# Patient Record
Sex: FEMALE | Race: WHITE | NOT HISPANIC OR LATINO | Employment: OTHER | URBAN - METROPOLITAN AREA
[De-identification: names, ages, dates, MRNs, and addresses within clinical notes are randomized per-mention and may not be internally consistent; named-entity substitution may affect disease eponyms.]

---

## 2017-10-03 ENCOUNTER — TRANSCRIBE ORDERS (OUTPATIENT)
Dept: URGENT CARE | Facility: MEDICAL CENTER | Age: 42
End: 2017-10-03

## 2017-10-03 ENCOUNTER — APPOINTMENT (OUTPATIENT)
Dept: URGENT CARE | Facility: MEDICAL CENTER | Age: 42
End: 2017-10-03

## 2017-10-03 ENCOUNTER — APPOINTMENT (OUTPATIENT)
Dept: LAB | Facility: MEDICAL CENTER | Age: 42
End: 2017-10-03

## 2017-10-03 DIAGNOSIS — Z02.1 PRE-EMPLOYMENT HEALTH SCREENING EXAMINATION: ICD-10-CM

## 2017-10-03 DIAGNOSIS — Z02.1 PRE-EMPLOYMENT HEALTH SCREENING EXAMINATION: Primary | ICD-10-CM

## 2017-10-03 PROCEDURE — 36415 COLL VENOUS BLD VENIPUNCTURE: CPT

## 2017-10-03 PROCEDURE — 86480 TB TEST CELL IMMUN MEASURE: CPT

## 2017-10-05 LAB
ANNOTATION COMMENT IMP: NORMAL
GAMMA INTERFERON BACKGROUND BLD IA-ACNC: 0.03 IU/ML
M TB IFN-G BLD-IMP: NEGATIVE
M TB IFN-G CD4+ BCKGRND COR BLD-ACNC: 0 IU/ML
M TB IFN-G CD4+ T-CELLS BLD-ACNC: 0.03 IU/ML
MITOGEN IGNF BLD-ACNC: 8.72 IU/ML
QUANTIFERON-TB GOLD IN TUBE: NORMAL
SERVICE CMNT-IMP: NORMAL

## 2020-11-08 ENCOUNTER — HOSPITAL ENCOUNTER (EMERGENCY)
Facility: HOSPITAL | Age: 45
Discharge: HOME | End: 2020-11-08
Attending: EMERGENCY MEDICINE | Admitting: EMERGENCY MEDICINE
Payer: COMMERCIAL

## 2020-11-08 VITALS
SYSTOLIC BLOOD PRESSURE: 170 MMHG | DIASTOLIC BLOOD PRESSURE: 99 MMHG | OXYGEN SATURATION: 100 % | RESPIRATION RATE: 16 BRPM | TEMPERATURE: 97.7 F | WEIGHT: 150 LBS | HEIGHT: 67 IN | BODY MASS INDEX: 23.54 KG/M2 | HEART RATE: 80 BPM

## 2020-11-08 DIAGNOSIS — K08.89 PAIN, DENTAL: Primary | ICD-10-CM

## 2020-11-08 PROCEDURE — 99284 EMERGENCY DEPT VISIT MOD MDM: CPT | Performed by: EMERGENCY MEDICINE

## 2020-11-08 PROCEDURE — 99282 EMERGENCY DEPT VISIT SF MDM: CPT

## 2020-11-08 RX ORDER — PENICILLIN V POTASSIUM 500 MG/1
500 TABLET ORAL 4 TIMES DAILY
Qty: 28 TABLET | Refills: 0 | Status: SHIPPED | OUTPATIENT
Start: 2020-11-08 | End: 2020-11-15

## 2020-11-08 RX ORDER — HYDROCODONE BITARTRATE AND ACETAMINOPHEN 5; 325 MG/1; MG/1
1 TABLET ORAL EVERY 6 HOURS PRN
Qty: 8 TABLET | Refills: 0 | Status: SHIPPED | OUTPATIENT
Start: 2020-11-08

## 2020-11-08 RX ORDER — PENICILLIN V POTASSIUM 250 MG/1
500 TABLET ORAL ONCE
Status: COMPLETED | OUTPATIENT
Start: 2020-11-08 | End: 2020-11-08

## 2020-11-08 RX ORDER — HYDROCODONE BITARTRATE AND ACETAMINOPHEN 5; 325 MG/1; MG/1
1 TABLET ORAL ONCE
Status: COMPLETED | OUTPATIENT
Start: 2020-11-08 | End: 2020-11-08

## 2020-11-08 RX ADMIN — PENICILLIN V POTASSIUM 500 MG: 250 TABLET, FILM COATED ORAL at 11:38

## 2020-11-08 RX ADMIN — HYDROCODONE BITARTRATE AND ACETAMINOPHEN 1 TABLET: 5; 325 TABLET ORAL at 11:48

## 2021-02-11 ENCOUNTER — HOSPITAL ENCOUNTER (EMERGENCY)
Facility: HOSPITAL | Age: 46
Discharge: HOME | End: 2021-02-11
Attending: EMERGENCY MEDICINE
Payer: COMMERCIAL

## 2021-02-11 VITALS
RESPIRATION RATE: 18 BRPM | DIASTOLIC BLOOD PRESSURE: 109 MMHG | TEMPERATURE: 97.8 F | OXYGEN SATURATION: 100 % | SYSTOLIC BLOOD PRESSURE: 168 MMHG | WEIGHT: 142.2 LBS | BODY MASS INDEX: 22.27 KG/M2 | HEART RATE: 70 BPM

## 2021-02-11 DIAGNOSIS — S05.01XA ABRASION OF RIGHT CORNEA, INITIAL ENCOUNTER: Primary | ICD-10-CM

## 2021-02-11 PROCEDURE — 99284 EMERGENCY DEPT VISIT MOD MDM: CPT | Performed by: PHYSICIAN ASSISTANT

## 2021-02-11 PROCEDURE — 99283 EMERGENCY DEPT VISIT LOW MDM: CPT

## 2021-02-11 RX ORDER — ERYTHROMYCIN 5 MG/G
OINTMENT OPHTHALMIC
Qty: 3.5 G | Refills: 0 | Status: SHIPPED | OUTPATIENT
Start: 2021-02-11

## 2021-02-11 RX ADMIN — FLUORESCEIN SODIUM 1 STRIP: 1 STRIP OPHTHALMIC at 12:40

## 2021-02-11 NOTE — ED PROVIDER NOTES
History  Chief Complaint   Patient presents with    Eye Injury     Pt  reports accidently using nail glue instead of eye drops on left eye last night  Pt  reports using water rinses and warm compresses without relief  Pt  states able to open eye but burning and pain  Patient is a 38 y/o female presenting to the ED for evaluation of nail glue in right eye  Patient states she accidentally put nail glue in her right eye last night thinking it was her eye drops  She has had increased pain and burning in her eye  She thoroughly rinsed her eye last night and applied warm compresses without relief  This morning, she went to urgent care and was told she needed to go to the ED  She is able to open the eye and denies visual changes or photophobia  Prior to Admission Medications   Prescriptions Last Dose Informant Patient Reported? Taking? HYDROcodone-acetaminophen (NORCO) 5-325 mg per tablet   No No   Sig: Take 1 tablet by mouth every 6 (six) hours as needed for pain for up to 8 dosesMax Daily Amount: 4 tablets      Facility-Administered Medications: None       History reviewed  No pertinent past medical history  Past Surgical History:   Procedure Laterality Date     SECTION         History reviewed  No pertinent family history  I have reviewed and agree with the history as documented  E-Cigarette/Vaping     E-Cigarette/Vaping Substances     Social History     Tobacco Use    Smoking status: Current Every Day Smoker     Packs/day: 0 50    Smokeless tobacco: Never Used   Substance Use Topics    Alcohol use: Never     Frequency: Never    Drug use: Never       Review of Systems   Constitutional: Negative for appetite change, chills, fatigue and fever  HENT: Negative for congestion, rhinorrhea, sinus pressure, sinus pain and sore throat  Eyes: Positive for pain and redness  Negative for photophobia, discharge, itching and visual disturbance     Respiratory: Negative for cough, shortness of breath and wheezing  Cardiovascular: Negative for chest pain, palpitations and leg swelling  Gastrointestinal: Negative for abdominal pain, blood in stool, constipation, diarrhea, nausea and vomiting  Genitourinary: Negative for difficulty urinating, dysuria, flank pain, frequency, hematuria and urgency  Musculoskeletal: Negative for arthralgias, back pain, joint swelling, myalgias and neck pain  Skin: Negative for color change, pallor and rash  Neurological: Negative for dizziness, syncope, weakness, light-headedness and headaches  All other systems reviewed and are negative  Physical Exam  Physical Exam  Vitals signs and nursing note reviewed  Constitutional:       General: She is awake  She is not in acute distress  Appearance: Normal appearance  She is well-developed  HENT:      Head: Normocephalic and atraumatic  Nose: Nose normal    Eyes:      General: Lids are everted, no foreign bodies appreciated  Vision grossly intact  Gaze aligned appropriately  Right eye: No foreign body or discharge  Left eye: No foreign body or discharge  Extraocular Movements: Extraocular movements intact  Conjunctiva/sclera:      Right eye: Right conjunctiva is injected  Left eye: Left conjunctiva is not injected  Pupils: Pupils are equal, round, and reactive to light  Right eye: Corneal abrasion (small abrasion on inferior edge of cornea) and fluorescein uptake present  Left eye: No corneal abrasion or fluorescein uptake  Comments: Some dried glue around lashes - dissolved/wiped away with sterile lubricant  No visible foreign bodies in the eye  Vision:  R eye - 20/25  L eye - 20/25  Both eyes - 20/20   Neck:      Musculoskeletal: Normal range of motion and neck supple  Cardiovascular:      Rate and Rhythm: Normal rate and regular rhythm  Pulses: Normal pulses  Radial pulses are 2+ on the right side and 2+ on the left side  Heart sounds: Normal heart sounds, S1 normal and S2 normal    Pulmonary:      Effort: Pulmonary effort is normal       Breath sounds: Normal breath sounds  No decreased breath sounds, wheezing, rhonchi or rales  Abdominal:      General: Abdomen is flat  Palpations: Abdomen is soft  Musculoskeletal:      Right lower leg: No edema  Left lower leg: No edema  Lymphadenopathy:      Cervical: No cervical adenopathy  Skin:     General: Skin is warm  Capillary Refill: Capillary refill takes less than 2 seconds  Neurological:      Mental Status: She is alert and oriented to person, place, and time  GCS: GCS eye subscore is 4  GCS verbal subscore is 5  GCS motor subscore is 6  Psychiatric:         Attention and Perception: Attention normal          Mood and Affect: Mood normal          Speech: Speech normal          Behavior: Behavior normal  Behavior is cooperative  Vital Signs  ED Triage Vitals   Temperature Pulse Respirations Blood Pressure SpO2   02/11/21 1231 02/11/21 1229 02/11/21 1229 02/11/21 1231 02/11/21 1229   97 8 °F (36 6 °C) 70 18 (!) 168/109 100 %      Temp Source Heart Rate Source Patient Position - Orthostatic VS BP Location FiO2 (%)   02/11/21 1231 02/11/21 1229 02/11/21 1231 -- --   Oral Monitor Lying        Pain Score       --                  Vitals:    02/11/21 1229 02/11/21 1231   BP:  (!) 168/109   Pulse: 70    Patient Position - Orthostatic VS:  Lying         Visual Acuity      ED Medications  Medications   fluorescein sodium sterile ophthalmic strip 1 strip (1 strip Left Eye Given 2/11/21 1240)       Diagnostic Studies  Results Reviewed     None                 No orders to display              Procedures  Procedures         ED Course                             SBIRT 20yo+      Most Recent Value   SBIRT (25 yo +)   In order to provide better care to our patients, we are screening all of our patients for alcohol and drug use   Would it be okay to ask you these screening questions? Unable to answer at this time Filed at: 02/11/2021 1239                    [facility]  Number of Diagnoses or Management Options  Abrasion of right cornea, initial encounter:   Diagnosis management comments: The management plan was discussed in detail with the patient at bedside and all questions were answered  Prior to discharge, verbal and written instructions provided  Patient given rx erythromycin ointment and follow-up information for ophtho  ED return precautions discussed in detail  The patient verbalized understanding of our discussion and plan of care, and agrees to return to the Emergency Department for concerns and progression of illness  Amount and/or Complexity of Data Reviewed  Discuss the patient with other providers: yes    Patient Progress  Patient progress: stable      Disposition  Final diagnoses:   Abrasion of right cornea, initial encounter     Time reflects when diagnosis was documented in both MDM as applicable and the Disposition within this note     Time User Action Codes Description Comment    2/11/2021  1:20 PM Annalee Borden Add [S05 01XA] Abrasion of right cornea, initial encounter       ED Disposition     ED Disposition Condition Date/Time Comment    Discharge Stable Thu Feb 11, 2021  1:24 PM Nestor Gómez discharge to home/self care              Follow-up Information     Follow up With Specialties Details Why Contact Info Additional [address] Ophthalmology Schedule an appointment as soon as possible for a visit   [id number] [facility] [address] [place] [address] Emergency Department Emergency Medicine  If symptoms worsen [address] Emergency Department, [address] [place] [address], [phone]          Discharge Medication List as of 2/11/2021  1:24 PM      START taking these medications    Details   erythromycin (ILOTYCIN) ophthalmic ointment Place a 1/2 inch ribbon of ointment into the lower eyelid  , Normal         CONTINUE these medications which have NOT CHANGED    Details   HYDROcodone-acetaminophen (NORCO) 5-325 mg per tablet Take 1 tablet by mouth every 6 (six) hours as needed for pain for up to 8 dosesMax Daily Amount: 4 tablets, Starting Sun 11/8/2020, Normal           No discharge procedures on file      PDMP Review     None          ED Provider  Electronically Signed by           Demetrio Sommers PA-C  02/11/21 1354

## 2022-06-26 ENCOUNTER — APPOINTMENT (EMERGENCY)
Dept: CT IMAGING | Facility: HOSPITAL | Age: 47
End: 2022-06-26
Payer: COMMERCIAL

## 2022-06-26 ENCOUNTER — HOSPITAL ENCOUNTER (EMERGENCY)
Facility: HOSPITAL | Age: 47
Discharge: HOME | End: 2022-06-26
Attending: EMERGENCY MEDICINE | Admitting: EMERGENCY MEDICINE
Payer: COMMERCIAL

## 2022-06-26 VITALS
HEART RATE: 70 BPM | BODY MASS INDEX: 23.3 KG/M2 | OXYGEN SATURATION: 97 % | DIASTOLIC BLOOD PRESSURE: 90 MMHG | HEIGHT: 66 IN | WEIGHT: 145 LBS | RESPIRATION RATE: 18 BRPM | TEMPERATURE: 97.1 F | SYSTOLIC BLOOD PRESSURE: 153 MMHG

## 2022-06-26 DIAGNOSIS — R33.8 ACUTE URINARY RETENTION: Primary | ICD-10-CM

## 2022-06-26 DIAGNOSIS — R10.30 LOWER ABDOMINAL PAIN: ICD-10-CM

## 2022-06-26 DIAGNOSIS — M70.72 ILIOPSOAS BURSITIS OF LEFT HIP: ICD-10-CM

## 2022-06-26 LAB
ALBUMIN SERPL BCP-MCNC: 4.7 G/DL (ref 3.5–5)
ALP SERPL-CCNC: 55 U/L (ref 34–104)
ALT SERPL W P-5'-P-CCNC: 59 U/L (ref 7–52)
ANION GAP SERPL CALCULATED.3IONS-SCNC: 10 MMOL/L (ref 4–13)
APTT PPP: 23 SECONDS (ref 23–37)
AST SERPL W P-5'-P-CCNC: 59 U/L (ref 13–39)
BASOPHILS # BLD AUTO: 0.03 THOUSANDS/ΜL (ref 0–0.1)
BASOPHILS NFR BLD AUTO: 1 % (ref 0–1)
BILIRUB SERPL-MCNC: 1.04 MG/DL (ref 0.2–1)
BILIRUB UR QL STRIP: NEGATIVE
BUN SERPL-MCNC: 5 MG/DL (ref 5–25)
CALCIUM SERPL-MCNC: 9.2 MG/DL (ref 8.4–10.2)
CHLORIDE SERPL-SCNC: 101 MMOL/L (ref 96–108)
CLARITY UR: CLEAR
CO2 SERPL-SCNC: 25 MMOL/L (ref 21–32)
COLOR UR: YELLOW
CREAT SERPL-MCNC: 0.54 MG/DL (ref 0.6–1.3)
EOSINOPHIL # BLD AUTO: 0.08 THOUSAND/ΜL (ref 0–0.61)
EOSINOPHIL NFR BLD AUTO: 1 % (ref 0–6)
ERYTHROCYTE [DISTWIDTH] IN BLOOD BY AUTOMATED COUNT: 13.7 % (ref 11.6–15.1)
EXT PREG TEST URINE: NEGATIVE
EXT. CONTROL ED NAV: NORMAL
GFR SERPL CREATININE-BSD FRML MDRD: 114 ML/MIN/1.73SQ M
GLUCOSE SERPL-MCNC: 94 MG/DL (ref 65–140)
GLUCOSE UR STRIP-MCNC: NEGATIVE MG/DL
HCT VFR BLD AUTO: 44 % (ref 34.8–46.1)
HGB BLD-MCNC: 14.8 G/DL (ref 11.5–15.4)
HGB UR QL STRIP.AUTO: NEGATIVE
IMM GRANULOCYTES # BLD AUTO: 0.01 THOUSAND/UL (ref 0–0.2)
IMM GRANULOCYTES NFR BLD AUTO: 0 % (ref 0–2)
INR PPP: 0.99 (ref 0.84–1.19)
KETONES UR STRIP-MCNC: NEGATIVE MG/DL
LEUKOCYTE ESTERASE UR QL STRIP: NEGATIVE
LYMPHOCYTES # BLD AUTO: 1.25 THOUSANDS/ΜL (ref 0.6–4.47)
LYMPHOCYTES NFR BLD AUTO: 19 % (ref 14–44)
MCH RBC QN AUTO: 33.8 PG (ref 26.8–34.3)
MCHC RBC AUTO-ENTMCNC: 33.6 G/DL (ref 31.4–37.4)
MCV RBC AUTO: 101 FL (ref 82–98)
MONOCYTES # BLD AUTO: 0.48 THOUSAND/ΜL (ref 0.17–1.22)
MONOCYTES NFR BLD AUTO: 7 % (ref 4–12)
NEUTROPHILS # BLD AUTO: 4.68 THOUSANDS/ΜL (ref 1.85–7.62)
NEUTS SEG NFR BLD AUTO: 72 % (ref 43–75)
NITRITE UR QL STRIP: NEGATIVE
NRBC BLD AUTO-RTO: 0 /100 WBCS
PH UR STRIP.AUTO: 6.5 [PH]
PLATELET # BLD AUTO: 185 THOUSANDS/UL (ref 149–390)
PMV BLD AUTO: 10 FL (ref 8.9–12.7)
POTASSIUM SERPL-SCNC: 3.7 MMOL/L (ref 3.5–5.3)
PROT SERPL-MCNC: 8.3 G/DL (ref 6.4–8.4)
PROT UR STRIP-MCNC: NEGATIVE MG/DL
PROTHROMBIN TIME: 13 SECONDS (ref 11.6–14.5)
RBC # BLD AUTO: 4.38 MILLION/UL (ref 3.81–5.12)
SODIUM SERPL-SCNC: 136 MMOL/L (ref 135–147)
SP GR UR STRIP.AUTO: 1.01 (ref 1–1.03)
UROBILINOGEN UR QL STRIP.AUTO: 1 E.U./DL
WBC # BLD AUTO: 6.53 THOUSAND/UL (ref 4.31–10.16)

## 2022-06-26 PROCEDURE — 36415 COLL VENOUS BLD VENIPUNCTURE: CPT | Performed by: EMERGENCY MEDICINE

## 2022-06-26 PROCEDURE — 81025 URINE PREGNANCY TEST: CPT | Performed by: EMERGENCY MEDICINE

## 2022-06-26 PROCEDURE — 85730 THROMBOPLASTIN TIME PARTIAL: CPT | Performed by: EMERGENCY MEDICINE

## 2022-06-26 PROCEDURE — 81003 URINALYSIS AUTO W/O SCOPE: CPT | Performed by: EMERGENCY MEDICINE

## 2022-06-26 PROCEDURE — 85025 COMPLETE CBC W/AUTO DIFF WBC: CPT | Performed by: EMERGENCY MEDICINE

## 2022-06-26 PROCEDURE — G1004 CDSM NDSC: HCPCS

## 2022-06-26 PROCEDURE — 74176 CT ABD & PELVIS W/O CONTRAST: CPT

## 2022-06-26 PROCEDURE — 96374 THER/PROPH/DIAG INJ IV PUSH: CPT

## 2022-06-26 PROCEDURE — 85610 PROTHROMBIN TIME: CPT | Performed by: EMERGENCY MEDICINE

## 2022-06-26 PROCEDURE — 99284 EMERGENCY DEPT VISIT MOD MDM: CPT | Performed by: EMERGENCY MEDICINE

## 2022-06-26 PROCEDURE — 99284 EMERGENCY DEPT VISIT MOD MDM: CPT

## 2022-06-26 PROCEDURE — 80053 COMPREHEN METABOLIC PANEL: CPT | Performed by: EMERGENCY MEDICINE

## 2022-06-26 RX ORDER — KETOROLAC TROMETHAMINE 30 MG/ML
15 INJECTION, SOLUTION INTRAMUSCULAR; INTRAVENOUS ONCE
Status: COMPLETED | OUTPATIENT
Start: 2022-06-26 | End: 2022-06-26

## 2022-06-26 RX ADMIN — KETOROLAC TROMETHAMINE 15 MG: 30 INJECTION, SOLUTION INTRAMUSCULAR at 10:58

## 2022-06-26 NOTE — ED PROVIDER NOTES
History  Chief Complaint   Patient presents with    Urinary Retention       Patient has not voided since last evening  States that when she did go there was no burning  but had hesitancy     Patient is a 53-year-old female with no significant medical history, who presents for evaluation of inability urinate  Patient says she has not been able to urinate since last night  She says that she was only able to get a little bit out last night    She said that she really had to strain to do so  She admits to suprapubic pressure   Patient says she has never had symptoms like this before  She says she has had urinary tract infections in the past but it did not feel anything like this    She denies any fevers, chills, back pain, nausea, vomiting  She is not on any medications at home  Prior to Admission Medications   Prescriptions Last Dose Informant Patient Reported? Taking? HYDROcodone-acetaminophen (NORCO) 5-325 mg per tablet Not Taking at Unknown time  No No   Sig: Take 1 tablet by mouth every 6 (six) hours as needed for pain for up to 8 dosesMax Daily Amount: 4 tablets   Patient not taking: Reported on 2022   erythromycin (ILOTYCIN) ophthalmic ointment Not Taking at Unknown time  No No   Sig: Place a 1/2 inch ribbon of ointment into the lower eyelid  Patient not taking: Reported on 2022      Facility-Administered Medications: None       History reviewed  No pertinent past medical history  Past Surgical History:   Procedure Laterality Date     SECTION         History reviewed  No pertinent family history  I have reviewed and agree with the history as documented      E-Cigarette/Vaping     E-Cigarette/Vaping Substances     Social History     Tobacco Use    Smoking status: Current Every Day Smoker     Packs/day: 0 50    Smokeless tobacco: Never Used   Substance Use Topics    Alcohol use: Never    Drug use: Never       Review of Systems   Constitutional: Negative for chills, diaphoresis and fever  HENT: Negative for congestion, sinus pressure, sore throat and trouble swallowing  Eyes: Negative for pain, discharge and itching  Respiratory: Negative for cough, chest tightness, shortness of breath and wheezing  Cardiovascular: Negative for chest pain, palpitations and leg swelling  Gastrointestinal: Positive for abdominal pain (suprapubic pressure)  Negative for abdominal distention, blood in stool, diarrhea, nausea and vomiting  Endocrine: Negative for polyphagia and polyuria  Genitourinary: Positive for difficulty urinating  Negative for dysuria, flank pain, hematuria, pelvic pain and vaginal bleeding  Musculoskeletal: Negative for arthralgias and back pain  Skin: Negative for rash  Neurological: Negative for dizziness, syncope, weakness, light-headedness and headaches  Physical Exam  Physical Exam  Vitals and nursing note reviewed  Constitutional:       General: She is not in acute distress  Appearance: She is well-developed  HENT:      Head: Normocephalic and atraumatic  Right Ear: External ear normal       Left Ear: External ear normal       Nose: Nose normal       Mouth/Throat:      Mouth: Mucous membranes are moist       Pharynx: No oropharyngeal exudate  Eyes:      Conjunctiva/sclera: Conjunctivae normal       Pupils: Pupils are equal, round, and reactive to light  Cardiovascular:      Rate and Rhythm: Normal rate and regular rhythm  Heart sounds: Normal heart sounds  No murmur heard  No friction rub  No gallop  Pulmonary:      Effort: Pulmonary effort is normal  No respiratory distress  Breath sounds: Normal breath sounds  No wheezing or rales  Abdominal:      General: There is no distension  Palpations: Abdomen is soft  Tenderness: There is abdominal tenderness (suprapubic)  There is no guarding  Musculoskeletal:         General: No swelling, tenderness or deformity  Normal range of motion        Cervical back: Normal range of motion and neck supple  Lymphadenopathy:      Cervical: No cervical adenopathy  Skin:     General: Skin is warm and dry  Neurological:      General: No focal deficit present  Mental Status: She is alert and oriented to person, place, and time  Mental status is at baseline  Cranial Nerves: No cranial nerve deficit  Sensory: No sensory deficit  Motor: No weakness or abnormal muscle tone        Coordination: Coordination normal          Vital Signs  ED Triage Vitals [06/26/22 0936]   Temperature Pulse Respirations Blood Pressure SpO2   (!) 97 1 °F (36 2 °C) 75 20 138/94 96 %      Temp Source Heart Rate Source Patient Position - Orthostatic VS BP Location FiO2 (%)   Temporal Monitor Sitting Right arm --      Pain Score       10 - Worst Possible Pain           Vitals:    06/26/22 0936 06/26/22 1218   BP: 138/94 153/90   Pulse: 75 70   Patient Position - Orthostatic VS: Sitting Sitting         Visual Acuity      ED Medications  Medications   ketorolac (TORADOL) injection 15 mg (15 mg Intravenous Given 6/26/22 1058)       Diagnostic Studies  Results Reviewed     Procedure Component Value Units Date/Time    Comprehensive metabolic panel [[id number]]  (Abnormal) Collected: 06/26/22 1057    Lab Status: Final result Specimen: Blood from Arm, Right Updated: 06/26/22 1124     Sodium 136 mmol/L      Potassium 3 7 mmol/L      Chloride 101 mmol/L      CO2 25 mmol/L      ANION GAP 10 mmol/L      BUN 5 mg/dL      Creatinine 0 54 mg/dL      Glucose 94 mg/dL      Calcium 9 2 mg/dL      AST 59 U/L      ALT 59 U/L      Alkaline Phosphatase 55 U/L      Total Protein 8 3 g/dL      Albumin 4 7 g/dL      Total Bilirubin 1 04 mg/dL      eGFR 114 ml/min/1 73sq m     Narrative:      [place] guidelines for Chronic Kidney Disease (CKD):     Stage 1 with normal or high GFR (GFR > 90 mL/min/1 73 square meters)    Stage 2 Mild CKD (GFR = 60-89 mL/min/1 73 square meters)   Stage 3A Moderate CKD (GFR = 45-59 mL/min/1 73 square meters)    Stage 3B Moderate CKD (GFR = 30-44 mL/min/1 73 square meters)    Stage 4 Severe CKD (GFR = 15-29 mL/min/1 73 square meters)    Stage 5 End Stage CKD (GFR <15 mL/min/1 73 square meters)  Note: GFR calculation is accurate only with a steady state creatinine    Protime-INR [[id number]]  (Normal) Collected: 06/26/22 1057    Lab Status: Final result Specimen: Blood from Arm, Right Updated: 06/26/22 1115     Protime 13 0 seconds      INR 0 99    APTT [[id number]]  (Normal) Collected: 06/26/22 1057    Lab Status: Final result Specimen: Blood from Arm, Right Updated: 06/26/22 1115     PTT 23 seconds     CBC and differential [[id number]]  (Abnormal) Collected: 06/26/22 1057    Lab Status: Final result Specimen: Blood from Arm, Right Updated: 06/26/22 1110     WBC 6 53 Thousand/uL      RBC 4 38 Million/uL      Hemoglobin 14 8 g/dL      Hematocrit 44 0 %       fL      MCH 33 8 pg      MCHC 33 6 g/dL      RDW 13 7 %      MPV 10 0 fL      Platelets 580 Thousands/uL      nRBC 0 /100 WBCs      Neutrophils Relative 72 %      Immat GRANS % 0 %      Lymphocytes Relative 19 %      Monocytes Relative 7 %      Eosinophils Relative 1 %      Basophils Relative 1 %      Neutrophils Absolute 4 68 Thousands/µL      Immature Grans Absolute 0 01 Thousand/uL      Lymphocytes Absolute 1 25 Thousands/µL      Monocytes Absolute 0 48 Thousand/µL      Eosinophils Absolute 0 08 Thousand/µL      Basophils Absolute 0 03 Thousands/µL     POCT pregnancy, urine [[id number]]  (Normal) Resulted: 06/26/22 1058    Lab Status: Final result Updated: 06/26/22 1059     EXT PREG TEST UR (Ref: Negative) negative     Control valid    UA w Reflex to Microscopic w Reflex to Culture [[id number]]  (Normal) Collected: 06/26/22 1025    Lab Status: Final result Specimen: Urine, Straight Cath Updated: 06/26/22 1035     Color, UA Yellow     Clarity, UA Clear     Specific Gravity, UA 1 015     pH, UA 6 5 Leukocytes, UA Negative     Nitrite, UA Negative     Protein, UA Negative mg/dl      Glucose, UA Negative mg/dl      Ketones, UA Negative mg/dl      Urobilinogen, UA 1 0 E U /dl      Bilirubin, UA Negative     Occult Blood, UA Negative                 CT abdomen pelvis wo contrast   Final Result by Preston Hassan DO (06/26 1140)      No acute inflammatory process or obstructive uropathy  Urinary bladder decompressed by Wick catheter  Nonobstructing bilateral renal calculi  Mild left iliopsoas bursitis and small bilateral hip joint effusions  Severe left hip osteoarthritis  Workstation performed: [id number]                    Procedures  Procedures         ED Course  ED Course as of 06/26/22 1605   Sun Jun 26, 2022   1038 Patient is still having discomfort despite Wick catheter placement  Mild amount of urine was drained  Will obtain blood work and CT scan of abdomen pelvis   1144 CT abdomen pelvis wo contrast   1203 Updated patient and boyfriend on lab work and imaging  Patient is requesting that the Wick catheter be removed  I told her that if we remove the Wick, it is likely that she will go home and not be able to urinate once again  The patient would still like it removed                                             [facility]  Number of Diagnoses or Management Options  Diagnosis management comments: 80-year-old female presenting for urinary retention  Has not been able to pee since last night  Admits to suprapubic pressure  No fevers, chills, back pain  No dysuria  Vitals within normal limits, suprapubic tenderness on exam  Will obtain UA, we will bladder scan    Will place Wick catheter      Disposition  Final diagnoses:   Acute urinary retention   Iliopsoas bursitis of left hip   Lower abdominal pain     Time reflects when diagnosis was documented in both MDM as applicable and the Disposition within this note     Time User Action Codes Description Comment    6/26/2022 12:04 PM Nora Powell Add [R33 8] Acute urinary retention     6/26/2022 12:04 PM Nora Powell Add [M70 72] Iliopsoas bursitis of left hip     6/26/2022 12:04 PM Nora Powell Add [R10 30] Lower abdominal pain       ED Disposition     ED Disposition   Discharge    Condition   Stable    Date/Time   Sun Jun 26, 2022 12:04 PM    Comment   Margaritapuneet Immanuels discharge to home/self care  Follow-up Information     Follow up With Specialties Details Why Contact Garett Sandoval MD Urology Schedule an appointment as soon as possible for a visit  For follow up of urinary retention [address] [place] [address]  [phone]            Discharge Medication List as of 6/26/2022 12:05 PM      CONTINUE these medications which have NOT CHANGED    Details   erythromycin (ILOTYCIN) ophthalmic ointment Place a 1/2 inch ribbon of ointment into the lower eyelid  , Normal      HYDROcodone-acetaminophen (NORCO) 5-325 mg per tablet Take 1 tablet by mouth every 6 (six) hours as needed for pain for up to 8 dosesMax Daily Amount: 4 tablets, Starting Sun 11/8/2020, Normal                 PDMP Review     None          ED Provider  Electronically Signed by           Molly Dial DO  06/26/22 2418

## 2022-10-12 ENCOUNTER — APPOINTMENT (EMERGENCY)
Dept: CT IMAGING | Facility: HOSPITAL | Age: 47
End: 2022-10-12
Payer: COMMERCIAL

## 2022-10-12 ENCOUNTER — HOSPITAL ENCOUNTER (EMERGENCY)
Facility: HOSPITAL | Age: 47
Discharge: HOME | End: 2022-10-12
Attending: EMERGENCY MEDICINE
Payer: COMMERCIAL

## 2022-10-12 VITALS
OXYGEN SATURATION: 99 % | HEART RATE: 73 BPM | HEIGHT: 66 IN | TEMPERATURE: 98.5 F | BODY MASS INDEX: 20.89 KG/M2 | SYSTOLIC BLOOD PRESSURE: 170 MMHG | DIASTOLIC BLOOD PRESSURE: 101 MMHG | RESPIRATION RATE: 18 BRPM | WEIGHT: 130 LBS

## 2022-10-12 DIAGNOSIS — R11.0 NAUSEA: ICD-10-CM

## 2022-10-12 DIAGNOSIS — R31.9 HEMATURIA: ICD-10-CM

## 2022-10-12 DIAGNOSIS — R74.01 TRANSAMINITIS: ICD-10-CM

## 2022-10-12 DIAGNOSIS — N20.0 KIDNEY STONE: Primary | ICD-10-CM

## 2022-10-12 DIAGNOSIS — R10.9 FLANK PAIN: ICD-10-CM

## 2022-10-12 LAB
ALBUMIN SERPL BCP-MCNC: 4.5 G/DL (ref 3.5–5)
ALP SERPL-CCNC: 62 U/L (ref 34–104)
ALT SERPL W P-5'-P-CCNC: 128 U/L (ref 7–52)
ANION GAP SERPL CALCULATED.3IONS-SCNC: 9 MMOL/L (ref 4–13)
AST SERPL W P-5'-P-CCNC: 182 U/L (ref 13–39)
BACTERIA UR QL AUTO: ABNORMAL /HPF
BASOPHILS # BLD AUTO: 0.02 THOUSANDS/ΜL (ref 0–0.1)
BASOPHILS NFR BLD AUTO: 1 % (ref 0–1)
BILIRUB SERPL-MCNC: 0.88 MG/DL (ref 0.2–1)
BILIRUB UR QL STRIP: ABNORMAL
BUN SERPL-MCNC: 7 MG/DL (ref 5–25)
CALCIUM SERPL-MCNC: 9.9 MG/DL (ref 8.4–10.2)
CHLORIDE SERPL-SCNC: 100 MMOL/L (ref 96–108)
CLARITY UR: CLEAR
CO2 SERPL-SCNC: 26 MMOL/L (ref 21–32)
COLOR UR: ABNORMAL
CREAT SERPL-MCNC: 0.82 MG/DL (ref 0.6–1.3)
EOSINOPHIL # BLD AUTO: 0.11 THOUSAND/ΜL (ref 0–0.61)
EOSINOPHIL NFR BLD AUTO: 3 % (ref 0–6)
ERYTHROCYTE [DISTWIDTH] IN BLOOD BY AUTOMATED COUNT: 12.1 % (ref 11.6–15.1)
EXT PREG TEST URINE: NEGATIVE
EXT. CONTROL ED NAV: NORMAL
GFR SERPL CREATININE-BSD FRML MDRD: 86 ML/MIN/1.73SQ M
GLUCOSE SERPL-MCNC: 85 MG/DL (ref 65–140)
GLUCOSE UR STRIP-MCNC: NEGATIVE MG/DL
HCT VFR BLD AUTO: 45 % (ref 34.8–46.1)
HGB BLD-MCNC: 15.3 G/DL (ref 11.5–15.4)
HGB UR QL STRIP.AUTO: ABNORMAL
IMM GRANULOCYTES # BLD AUTO: 0 THOUSAND/UL (ref 0–0.2)
IMM GRANULOCYTES NFR BLD AUTO: 0 % (ref 0–2)
KETONES UR STRIP-MCNC: NEGATIVE MG/DL
LEUKOCYTE ESTERASE UR QL STRIP: NEGATIVE
LIPASE SERPL-CCNC: 44 U/L (ref 11–82)
LYMPHOCYTES # BLD AUTO: 0.96 THOUSANDS/ΜL (ref 0.6–4.47)
LYMPHOCYTES NFR BLD AUTO: 29 % (ref 14–44)
MCH RBC QN AUTO: 34.5 PG (ref 26.8–34.3)
MCHC RBC AUTO-ENTMCNC: 34 G/DL (ref 31.4–37.4)
MCV RBC AUTO: 102 FL (ref 82–98)
MONOCYTES # BLD AUTO: 0.3 THOUSAND/ΜL (ref 0.17–1.22)
MONOCYTES NFR BLD AUTO: 9 % (ref 4–12)
MUCOUS THREADS UR QL AUTO: ABNORMAL
NEUTROPHILS # BLD AUTO: 1.89 THOUSANDS/ΜL (ref 1.85–7.62)
NEUTS SEG NFR BLD AUTO: 58 % (ref 43–75)
NITRITE UR QL STRIP: NEGATIVE
NON-SQ EPI CELLS URNS QL MICRO: ABNORMAL /HPF
NRBC BLD AUTO-RTO: 0 /100 WBCS
PH UR STRIP.AUTO: 6 [PH]
PLATELET # BLD AUTO: 127 THOUSANDS/UL (ref 149–390)
PMV BLD AUTO: 10.7 FL (ref 8.9–12.7)
POTASSIUM SERPL-SCNC: 3.3 MMOL/L (ref 3.5–5.3)
PROT SERPL-MCNC: 8 G/DL (ref 6.4–8.4)
PROT UR STRIP-MCNC: ABNORMAL MG/DL
RBC # BLD AUTO: 4.43 MILLION/UL (ref 3.81–5.12)
RBC #/AREA URNS AUTO: ABNORMAL /HPF
SODIUM SERPL-SCNC: 135 MMOL/L (ref 135–147)
SP GR UR STRIP.AUTO: >=1.03 (ref 1–1.03)
UROBILINOGEN UR QL STRIP.AUTO: 1 E.U./DL
WBC # BLD AUTO: 3.28 THOUSAND/UL (ref 4.31–10.16)
WBC #/AREA URNS AUTO: ABNORMAL /HPF

## 2022-10-12 PROCEDURE — 96361 HYDRATE IV INFUSION ADD-ON: CPT

## 2022-10-12 PROCEDURE — 74176 CT ABD & PELVIS W/O CONTRAST: CPT

## 2022-10-12 PROCEDURE — 96374 THER/PROPH/DIAG INJ IV PUSH: CPT

## 2022-10-12 PROCEDURE — G1004 CDSM NDSC: HCPCS

## 2022-10-12 PROCEDURE — 99284 EMERGENCY DEPT VISIT MOD MDM: CPT | Performed by: EMERGENCY MEDICINE

## 2022-10-12 PROCEDURE — 85025 COMPLETE CBC W/AUTO DIFF WBC: CPT | Performed by: EMERGENCY MEDICINE

## 2022-10-12 PROCEDURE — 80053 COMPREHEN METABOLIC PANEL: CPT | Performed by: EMERGENCY MEDICINE

## 2022-10-12 PROCEDURE — 81001 URINALYSIS AUTO W/SCOPE: CPT | Performed by: EMERGENCY MEDICINE

## 2022-10-12 PROCEDURE — 99284 EMERGENCY DEPT VISIT MOD MDM: CPT

## 2022-10-12 PROCEDURE — 96375 TX/PRO/DX INJ NEW DRUG ADDON: CPT

## 2022-10-12 PROCEDURE — 81025 URINE PREGNANCY TEST: CPT | Performed by: EMERGENCY MEDICINE

## 2022-10-12 PROCEDURE — 83690 ASSAY OF LIPASE: CPT | Performed by: EMERGENCY MEDICINE

## 2022-10-12 PROCEDURE — 36415 COLL VENOUS BLD VENIPUNCTURE: CPT | Performed by: EMERGENCY MEDICINE

## 2022-10-12 RX ORDER — OXYCODONE HYDROCHLORIDE 5 MG/1
5 TABLET ORAL EVERY 6 HOURS PRN
Qty: 12 TABLET | Refills: 0 | Status: SHIPPED | OUTPATIENT
Start: 2022-10-12 | End: 2022-10-15

## 2022-10-12 RX ORDER — ONDANSETRON 2 MG/ML
4 INJECTION INTRAMUSCULAR; INTRAVENOUS ONCE
Status: COMPLETED | OUTPATIENT
Start: 2022-10-12 | End: 2022-10-12

## 2022-10-12 RX ORDER — ONDANSETRON 4 MG/1
4 TABLET, ORALLY DISINTEGRATING ORAL EVERY 6 HOURS PRN
Qty: 8 TABLET | Refills: 0 | Status: SHIPPED | OUTPATIENT
Start: 2022-10-12 | End: 2022-10-14

## 2022-10-12 RX ORDER — FENTANYL CITRATE 50 UG/ML
50 INJECTION, SOLUTION INTRAMUSCULAR; INTRAVENOUS ONCE
Status: COMPLETED | OUTPATIENT
Start: 2022-10-12 | End: 2022-10-12

## 2022-10-12 RX ORDER — KETOROLAC TROMETHAMINE 30 MG/ML
15 INJECTION, SOLUTION INTRAMUSCULAR; INTRAVENOUS ONCE
Status: COMPLETED | OUTPATIENT
Start: 2022-10-12 | End: 2022-10-12

## 2022-10-12 RX ADMIN — ONDANSETRON 4 MG: 2 INJECTION INTRAMUSCULAR; INTRAVENOUS at 12:37

## 2022-10-12 RX ADMIN — KETOROLAC TROMETHAMINE 15 MG: 30 INJECTION, SOLUTION INTRAMUSCULAR at 12:38

## 2022-10-12 RX ADMIN — SODIUM CHLORIDE 500 ML: 0.9 INJECTION, SOLUTION INTRAVENOUS at 12:42

## 2022-10-12 RX ADMIN — FENTANYL CITRATE 50 MCG: 50 INJECTION, SOLUTION INTRAMUSCULAR; INTRAVENOUS at 12:38

## 2022-10-12 NOTE — DISCHARGE INSTRUCTIONS
If you develop any new or worsening symptoms please immediately return to your nearest emergency department  Please schedule an appointment with your urologist for your kidney stones  Please make sure to see your GI doctor for your elevated Liver enzymes

## 2022-10-12 NOTE — ED PROVIDER NOTES
History  Chief Complaint   Patient presents with   • Flank Pain     C/o left flank pain w/ N/V  Pt denies symptoms on urination  32-year-old female presents to the ED for sudden onset of left flank pain starting sent home last night waking her from sleep  States she took Aleve around 4:00 a m  without relief  States feels similar to prior kidney stone which she had back in September  Was seen by Urology but was told that it was is not causing her an issue at that time that she could either have surgery or let it be and they decided to let it be  States pain is severe and she would “rather have a baby in a field than the pain she is feeling right now ”  States she had 1 episode of vomiting about it was likely due to the pain  Denies fevers, night sweats, chills, sore throat, cough, chest pain, shortness of breath, diarrhea constipation dysuria, hematuria, difficulty urinating  Prior to Admission Medications   Prescriptions Last Dose Informant Patient Reported? Taking? HYDROcodone-acetaminophen (NORCO) 5-325 mg per tablet   No No   Sig: Take 1 tablet by mouth every 6 (six) hours as needed for pain for up to 8 dosesMax Daily Amount: 4 tablets   Patient not taking: Reported on 2022   erythromycin (ILOTYCIN) ophthalmic ointment   No No   Sig: Place a 1/2 inch ribbon of ointment into the lower eyelid  Patient not taking: Reported on 2022      Facility-Administered Medications: None       History reviewed  No pertinent past medical history  Past Surgical History:   Procedure Laterality Date   •  SECTION         History reviewed  No pertinent family history  I have reviewed and agree with the history as documented      E-Cigarette/Vaping     E-Cigarette/Vaping Substances     Social History     Tobacco Use   • Smoking status: Current Every Day Smoker     Packs/day: 0 50   • Smokeless tobacco: Never Used   Substance Use Topics   • Alcohol use: Never   • Drug use: Never       Review of Systems   Gastrointestinal: Positive for vomiting  Genitourinary: Positive for flank pain  All other systems reviewed and are negative  Physical Exam  Physical Exam  Vitals and nursing note reviewed  Constitutional:       General: She is not in acute distress  Appearance: She is well-developed  She is not diaphoretic  HENT:      Head: Normocephalic and atraumatic  Right Ear: External ear normal       Left Ear: External ear normal       Nose: Nose normal    Eyes:      General: No scleral icterus  Right eye: No discharge  Left eye: No discharge  Conjunctiva/sclera: Conjunctivae normal       Pupils: Pupils are equal, round, and reactive to light  Neck:      Vascular: No JVD  Trachea: No tracheal deviation  Cardiovascular:      Rate and Rhythm: Normal rate and regular rhythm  Heart sounds: Normal heart sounds  No murmur heard  No friction rub  No gallop  Pulmonary:      Effort: Pulmonary effort is normal  No respiratory distress  Breath sounds: Normal breath sounds  No stridor  No wheezing or rales  Abdominal:      General: Bowel sounds are normal  There is no distension  Palpations: Abdomen is soft  There is no mass  Tenderness: There is no abdominal tenderness  There is left CVA tenderness  There is no right CVA tenderness, guarding or rebound  Hernia: No hernia is present  Musculoskeletal:         General: No tenderness or deformity  Normal range of motion  Cervical back: Normal range of motion and neck supple  Skin:     General: Skin is warm and dry  Coloration: Skin is not pale  Findings: No erythema or rash  Neurological:      General: No focal deficit present  Mental Status: She is alert and oriented to person, place, and time  Mental status is at baseline  Cranial Nerves: No cranial nerve deficit  Sensory: No sensory deficit  Motor: No abnormal muscle tone     Psychiatric: Behavior: Behavior normal          Thought Content:  Thought content normal          Judgment: Judgment normal          Vital Signs  ED Triage Vitals   Temperature Pulse Respirations Blood Pressure SpO2   10/12/22 1159 10/12/22 1159 10/12/22 1159 10/12/22 1201 10/12/22 1159   98 5 °F (36 9 °C) 73 18 (!) 170/101 99 %      Temp Source Heart Rate Source Patient Position - Orthostatic VS BP Location FiO2 (%)   10/12/22 1159 10/12/22 1159 10/12/22 1159 10/12/22 1159 --   Oral Monitor Sitting Left arm       Pain Score       10/12/22 1159       9           Vitals:    10/12/22 1159 10/12/22 1201   BP:  (!) 170/101   Pulse: 73    Patient Position - Orthostatic VS: Sitting          Visual Acuity      ED Medications  Medications   ketorolac (TORADOL) injection 15 mg (15 mg Intravenous Given 10/12/22 1238)   fentanyl citrate (PF) 100 MCG/2ML 50 mcg (50 mcg Intravenous Given 10/12/22 1238)   sodium chloride 0 9 % bolus 500 mL (0 mL Intravenous Stopped 10/12/22 1406)   ondansetron (ZOFRAN) injection 4 mg (4 mg Intravenous Given 10/12/22 1237)       Diagnostic Studies  Results Reviewed     Procedure Component Value Units Date/Time    CBC and differential [[id number]]  (Abnormal) Collected: 10/12/22 1234    Lab Status: Final result Specimen: Blood from Arm, Left Updated: 10/12/22 1321     WBC 3 28 Thousand/uL      RBC 4 43 Million/uL      Hemoglobin 15 3 g/dL      Hematocrit 45 0 %       fL      MCH 34 5 pg      MCHC 34 0 g/dL      RDW 12 1 %      MPV 10 7 fL      Platelets 239 Thousands/uL      nRBC 0 /100 WBCs      Neutrophils Relative 58 %      Immat GRANS % 0 %      Lymphocytes Relative 29 %      Monocytes Relative 9 %      Eosinophils Relative 3 %      Basophils Relative 1 %      Neutrophils Absolute 1 89 Thousands/µL      Immature Grans Absolute 0 00 Thousand/uL      Lymphocytes Absolute 0 96 Thousands/µL      Monocytes Absolute 0 30 Thousand/µL      Eosinophils Absolute 0 11 Thousand/µL      Basophils Absolute 0 02 Thousands/µL     POCT pregnancy, urine [[id number]]  (Normal) Resulted: 10/12/22 1301    Lab Status: Final result Updated: 10/12/22 1319     EXT PREG TEST UR (Ref: Negative) Negative     Control Valid    Urine Microscopic [[id number]]  (Abnormal) Collected: 10/12/22 1242    Lab Status: Final result Specimen: Urine, Clean Catch Updated: 10/12/22 1306     RBC, UA 20-30 /hpf      WBC, UA 1-2 /hpf      Epithelial Cells Occasional /hpf      Bacteria, UA None Seen /hpf      MUCUS THREADS Moderate    Comprehensive metabolic panel [[id number]]  (Abnormal) Collected: 10/12/22 1234    Lab Status: Final result Specimen: Blood from Arm, Left Updated: 10/12/22 1302     Sodium 135 mmol/L      Potassium 3 3 mmol/L      Chloride 100 mmol/L      CO2 26 mmol/L      ANION GAP 9 mmol/L      BUN 7 mg/dL      Creatinine 0 82 mg/dL      Glucose 85 mg/dL      Calcium 9 9 mg/dL       U/L       U/L      Alkaline Phosphatase 62 U/L      Total Protein 8 0 g/dL      Albumin 4 5 g/dL      Total Bilirubin 0 88 mg/dL      eGFR 86 ml/min/1 73sq m     Narrative:      [place] guidelines for Chronic Kidney Disease (CKD):   •  Stage 1 with normal or high GFR (GFR > 90 mL/min/1 73 square meters)  •  Stage 2 Mild CKD (GFR = 60-89 mL/min/1 73 square meters)  •  Stage 3A Moderate CKD (GFR = 45-59 mL/min/1 73 square meters)  •  Stage 3B Moderate CKD (GFR = 30-44 mL/min/1 73 square meters)  •  Stage 4 Severe CKD (GFR = 15-29 mL/min/1 73 square meters)  •  Stage 5 End Stage CKD (GFR <15 mL/min/1 73 square meters)  Note: GFR calculation is accurate only with a steady state creatinine    Lipase [[id number]]  (Normal) Collected: 10/12/22 1234    Lab Status: Final result Specimen: Blood from Arm, Left Updated: 10/12/22 1302     Lipase 44 u/L     UA w Reflex to Microscopic w Reflex to Culture [[id number]]  (Abnormal) Collected: 10/12/22 1242    Lab Status: Final result Specimen: Urine, Clean Catch Updated: 10/12/22 1259 Color, UA Audrey     Clarity, UA Clear     Specific Gravity, UA >=1 030     pH, UA 6 0     Leukocytes, UA Negative     Nitrite, UA Negative     Protein, UA Trace mg/dl      Glucose, UA Negative mg/dl      Ketones, UA Negative mg/dl      Urobilinogen, UA 1 0 E U /dl      Bilirubin, UA 1+     Occult Blood, UA 2+                 CT renal stone study abdomen pelvis wo contrast   Final Result by Julissa Marshall MD (10/12 1348)      Nonobstructive calculi are noted bilaterally  No ureteral calculi can be seen  No hydronephrosis  Degenerative changes of the left hip  Workstation performed: [id number]                    Procedures  Procedures         ED Course  ED Course as of 10/18/22 1153   Wed Oct 12, 2022   1307 Bacteria, UA: None Seen   1307 Blood, UA(!): 2+   1356 RBC, UA(!): 20-30   1408 Patient states she feels better and would like to go home at this time  Discussed elevated liver enzymes she states she has a GI doctor she follows with for her hepatitis-C  Patient also states she has a urologist that she follows with and will make appointment  Strict return precautions discussed and provided  SBIRT 22yo+    Flowsheet Row Most Recent Value   SBIRT (23 yo +)    In order to provide better care to our patients, we are screening all of our patients for alcohol and drug use  Would it be okay to ask you these screening questions? No Filed at: 10/12/2022 1215                    [facility]  Number of Diagnoses or Management Options  Diagnosis management comments: Patient history of kidney stone with CVA tenderness on the left stating it feels similar to prior kidney stones  Will check blood work, urinalysis, renal stone study scan, give IV fluids, pain medications        Disposition  Final diagnoses:   Kidney stone   Flank pain   Hematuria   Transaminitis   Nausea     Time reflects when diagnosis was documented in both MDM as applicable and the Disposition within this note Time User Action Codes Description Comment    10/12/2022  2:05 PM Jojo Other Add [N20 0] Kidney stone     10/12/2022  2:05 PM Jojo Other Add [R10 9] Flank pain     10/12/2022  2:05 PM Jojo Other Add [R31 9] Hematuria     10/12/2022  2:05 PM Jojo Other Add [R74 01] Transaminitis     10/12/2022  2:05 PM Jojo Other Add [R11 0] Nausea       ED Disposition     ED Disposition   Discharge    Condition   Stable    Date/Time   Wed Oct 12, 2022  2:05 PM    Comment   Phong Dennis discharge to home/self care  Follow-up Information     Follow up With Specialties Details Why Contact Info Additional Information    Your Primary Care Doctor  Go to        [facility] Emergency Medicine Go to  As needed, If symptoms worsen [address] [place] [id number]  [phone] [facility], [address], [facility], [address] [facility]          Discharge Medication List as of 10/12/2022  2:08 PM      START taking these medications    Details   ondansetron (Zofran ODT) 4 mg disintegrating tablet Take 1 tablet (4 mg total) by mouth every 6 (six) hours as needed for nausea or vomiting for up to 2 days, Starting Wed 10/12/2022, Until Fri 10/14/2022 at 2359, Normal      oxyCODONE (ROXICODONE) 5 immediate release tablet Take 1 tablet (5 mg total) by mouth every 6 (six) hours as needed for moderate pain for up to 3 days Max Daily Amount: 20 mg, Starting Wed 10/12/2022, Until Sat 10/15/2022 at 2359, Normal         CONTINUE these medications which have NOT CHANGED    Details   erythromycin (ILOTYCIN) ophthalmic ointment Place a 1/2 inch ribbon of ointment into the lower eyelid  , Normal      HYDROcodone-acetaminophen (NORCO) 5-325 mg per tablet Take 1 tablet by mouth every 6 (six) hours as needed for pain for up to 8 dosesMax Daily Amount: 4 tablets, Starting Sun 11/8/2020, Normal             No discharge procedures on file      PDMP Review       Value Time User    PDMP Reviewed  Yes 10/12/2022 12:17 PM Yoel Toledo DO          ED Provider  Electronically Signed by           Yoel Toledo DO  10/18/22 6014

## 2022-11-05 ENCOUNTER — HOSPITAL ENCOUNTER (EMERGENCY)
Facility: HOSPITAL | Age: 47
Discharge: HOME | End: 2022-11-05
Attending: EMERGENCY MEDICINE

## 2022-11-05 ENCOUNTER — APPOINTMENT (EMERGENCY)
Dept: CT IMAGING | Facility: HOSPITAL | Age: 47
End: 2022-11-05

## 2022-11-05 ENCOUNTER — APPOINTMENT (EMERGENCY)
Dept: RADIOLOGY | Facility: HOSPITAL | Age: 47
End: 2022-11-05

## 2022-11-05 VITALS
HEART RATE: 87 BPM | RESPIRATION RATE: 16 BRPM | SYSTOLIC BLOOD PRESSURE: 161 MMHG | OXYGEN SATURATION: 98 % | DIASTOLIC BLOOD PRESSURE: 105 MMHG | TEMPERATURE: 98.3 F

## 2022-11-05 DIAGNOSIS — S01.319A EAR LOBE LACERATION: Primary | ICD-10-CM

## 2022-11-05 LAB
ALBUMIN SERPL BCP-MCNC: 4.8 G/DL (ref 3.5–5)
ALP SERPL-CCNC: 68 U/L (ref 34–104)
ALT SERPL W P-5'-P-CCNC: 96 U/L (ref 7–52)
ANION GAP SERPL CALCULATED.3IONS-SCNC: 11 MMOL/L (ref 4–13)
APAP SERPL-MCNC: <10 UG/ML (ref 10–20)
APTT PPP: 23 SECONDS (ref 23–37)
AST SERPL W P-5'-P-CCNC: 99 U/L (ref 13–39)
BASOPHILS # BLD AUTO: 0.02 THOUSANDS/ÂΜL (ref 0–0.1)
BASOPHILS NFR BLD AUTO: 1 % (ref 0–1)
BILIRUB SERPL-MCNC: 0.45 MG/DL (ref 0.2–1)
BUN SERPL-MCNC: 10 MG/DL (ref 5–25)
CALCIUM SERPL-MCNC: 9.9 MG/DL (ref 8.4–10.2)
CHLORIDE SERPL-SCNC: 107 MMOL/L (ref 96–108)
CO2 SERPL-SCNC: 26 MMOL/L (ref 21–32)
CREAT SERPL-MCNC: 0.82 MG/DL (ref 0.6–1.3)
EOSINOPHIL # BLD AUTO: 0.1 THOUSAND/ÂΜL (ref 0–0.61)
EOSINOPHIL NFR BLD AUTO: 3 % (ref 0–6)
ERYTHROCYTE [DISTWIDTH] IN BLOOD BY AUTOMATED COUNT: 11.9 % (ref 11.6–15.1)
ETHANOL SERPL-MCNC: 282 MG/DL
GFR SERPL CREATININE-BSD FRML MDRD: 86 ML/MIN/1.73SQ M
GLUCOSE SERPL-MCNC: 81 MG/DL (ref 65–140)
HCT VFR BLD AUTO: 46 % (ref 34.8–46.1)
HGB BLD-MCNC: 15.6 G/DL (ref 11.5–15.4)
IMM GRANULOCYTES # BLD AUTO: 0.01 THOUSAND/UL (ref 0–0.2)
IMM GRANULOCYTES NFR BLD AUTO: 0 % (ref 0–2)
INR PPP: 1 (ref 0.84–1.19)
LYMPHOCYTES # BLD AUTO: 1.63 THOUSANDS/ÂΜL (ref 0.6–4.47)
LYMPHOCYTES NFR BLD AUTO: 44 % (ref 14–44)
MCH RBC QN AUTO: 33.7 PG (ref 26.8–34.3)
MCHC RBC AUTO-ENTMCNC: 33.9 G/DL (ref 31.4–37.4)
MCV RBC AUTO: 99 FL (ref 82–98)
MONOCYTES # BLD AUTO: 0.14 THOUSAND/ÂΜL (ref 0.17–1.22)
MONOCYTES NFR BLD AUTO: 4 % (ref 4–12)
NEUTROPHILS # BLD AUTO: 1.83 THOUSANDS/ÂΜL (ref 1.85–7.62)
NEUTS SEG NFR BLD AUTO: 48 % (ref 43–75)
NRBC BLD AUTO-RTO: 0 /100 WBCS
PLATELET # BLD AUTO: 114 THOUSANDS/UL (ref 149–390)
PMV BLD AUTO: 10.7 FL (ref 8.9–12.7)
POTASSIUM SERPL-SCNC: 3.5 MMOL/L (ref 3.5–5.3)
PROT SERPL-MCNC: 8.2 G/DL (ref 6.4–8.4)
PROTHROMBIN TIME: 13.2 SECONDS (ref 11.6–14.5)
RBC # BLD AUTO: 4.63 MILLION/UL (ref 3.81–5.12)
SALICYLATES SERPL-MCNC: <5 MG/DL (ref 3–20)
SODIUM SERPL-SCNC: 144 MMOL/L (ref 135–147)
WBC # BLD AUTO: 3.73 THOUSAND/UL (ref 4.31–10.16)

## 2022-11-05 RX ORDER — LIDOCAINE HYDROCHLORIDE 10 MG/ML
10 INJECTION, SOLUTION EPIDURAL; INFILTRATION; INTRACAUDAL; PERINEURAL ONCE
Status: COMPLETED | OUTPATIENT
Start: 2022-11-05 | End: 2022-11-05

## 2022-11-05 RX ORDER — GINSENG 100 MG
1 CAPSULE ORAL ONCE
Status: COMPLETED | OUTPATIENT
Start: 2022-11-05 | End: 2022-11-05

## 2022-11-05 RX ORDER — KETOROLAC TROMETHAMINE 30 MG/ML
15 INJECTION, SOLUTION INTRAMUSCULAR; INTRAVENOUS ONCE
Status: COMPLETED | OUTPATIENT
Start: 2022-11-05 | End: 2022-11-05

## 2022-11-05 RX ORDER — ACETAMINOPHEN 325 MG/1
975 TABLET ORAL ONCE
Status: DISCONTINUED | OUTPATIENT
Start: 2022-11-05 | End: 2022-11-05 | Stop reason: HOSPADM

## 2022-11-05 RX ADMIN — TETANUS TOXOID, REDUCED DIPHTHERIA TOXOID AND ACELLULAR PERTUSSIS VACCINE, ADSORBED 0.5 ML: 5; 2.5; 8; 8; 2.5 SUSPENSION INTRAMUSCULAR at 18:18

## 2022-11-05 RX ADMIN — SODIUM CHLORIDE 1000 ML: 0.9 INJECTION, SOLUTION INTRAVENOUS at 16:19

## 2022-11-05 RX ADMIN — KETOROLAC TROMETHAMINE 15 MG: 30 INJECTION, SOLUTION INTRAMUSCULAR at 18:19

## 2022-11-05 RX ADMIN — BACITRACIN 1 SMALL APPLICATION: 500 OINTMENT TOPICAL at 18:19

## 2022-11-05 RX ADMIN — LIDOCAINE HYDROCHLORIDE 10 ML: 10 INJECTION, SOLUTION EPIDURAL; INFILTRATION; INTRACAUDAL; PERINEURAL at 16:04

## 2022-11-05 NOTE — DISCHARGE INSTRUCTIONS
Do not get sutures wet for the first 24 hours  You may apply antibiotic ointment for the first 2 days but after that there should be a scab developing so just leave it clean and covered  After 24 hours, you may wash with warm soap and water once daily  Follow-up with family doctor or urgent care for suture removal in 7-10 days  Return to the ER if the affected area becomes red, hot, swollen, has any pus-like discharge or you develop any fevers

## 2022-11-06 LAB
ATRIAL RATE: 93 BPM
QRS AXIS: 74 DEGREES
QRSD INTERVAL: 82 MS
QT INTERVAL: 392 MS
QTC INTERVAL: 492 MS
T WAVE AXIS: 65 DEGREES
VENTRICULAR RATE: 95 BPM

## 2022-11-06 NOTE — ED PROVIDER NOTES
Emergency Department Trauma Note  Krishna Gibson 41 y o  female MRN: [id number]  Unit/Bed#: ED 17/ED 17 Encounter: [id number]      Trauma Alert:    Model of Arrival:   via    Trauma Team: Current Providers  Attending Provider: Sintia Marshall DO  Attending Provider: Leann Pang MD  Registered Nurse: Yaima Yoo RN  Physician Assistant: Aida Magaña PA-C  Consultants:     None      History of Present Illness     Chief Complaint:   Chief Complaint   Patient presents with   • Laceration     R ear, intoxication      HPI:  Krishna Gibson is a 41 y o  female who presents with fall  Mechanism:           24-year-old female presents via EMS after a fall last night complaining of a right earlobe laceration  Patient reports that she has alcoholic and drinks daily and had significantly more last night than usual   Patient reports she drank 3/4 of a bottle of [address] last night  She reports that she fell from standing falling forward striking her right ear against a table causing a laceration  She denies loss of consciousness  She denies any chest pain or palpitations, dizziness or lightheadedness prior to the fall and describes a mechanical fall in nature  She denies any aspirin or anticoagulation use daily  She denies any other complaints or concerns at this time  She denies SI/HI  She denies any other drug ingestions  Review of Systems   Constitutional: Negative for chills, fatigue and fever  HENT: Negative for ear pain and sore throat  Eyes: Negative for pain  Respiratory: Negative for cough, shortness of breath and wheezing  Cardiovascular: Negative for chest pain, palpitations and leg swelling  Gastrointestinal: Negative for abdominal pain, constipation, diarrhea, nausea and vomiting  Endocrine: Negative for polyuria  Genitourinary: Negative for dysuria and pelvic pain  Musculoskeletal: Negative for arthralgias, myalgias, neck pain and neck stiffness     Skin: Positive for wound  Negative for rash  Neurological: Negative for dizziness, syncope, light-headedness and headaches  Psychiatric/Behavioral: Negative for suicidal ideas  The patient is not nervous/anxious  All other systems reviewed and are negative  Historical Information     Immunizations:   Immunization History   Administered Date(s) Administered   • COVID-19 PFIZER VACCINE 0 3 ML IM 2021, 2021   • Tdap 2022       History reviewed  No pertinent past medical history  History reviewed  No pertinent family history  Past Surgical History:   Procedure Laterality Date   •  SECTION       Social History     Tobacco Use   • Smoking status: Current Every Day Smoker     Packs/day: 1 00   • Smokeless tobacco: Never Used   Substance Use Topics   • Alcohol use: Yes   • Drug use: Never     E-Cigarette/Vaping     E-Cigarette/Vaping Substances       Family History: non-contributory    Meds/Allergies   Prior to Admission Medications   Prescriptions Last Dose Informant Patient Reported? Taking? HYDROcodone-acetaminophen (NORCO) 5-325 mg per tablet   No No   Sig: Take 1 tablet by mouth every 6 (six) hours as needed for pain for up to 8 dosesMax Daily Amount: 4 tablets   Patient not taking: Reported on 2022   erythromycin (ILOTYCIN) ophthalmic ointment   No No   Sig: Place a 1/2 inch ribbon of ointment into the lower eyelid     Patient not taking: Reported on 2022   ondansetron (Zofran ODT) 4 mg disintegrating tablet   No No   Sig: Take 1 tablet (4 mg total) by mouth every 6 (six) hours as needed for nausea or vomiting for up to 2 days      Facility-Administered Medications: None       No Known Allergies    PHYSICAL EXAM    PE limited by: none    Objective   Vitals:   First set: Temperature: 98 3 °F (36 8 °C) (22 1531)  Pulse: (!) 114 (22 1529)  Respirations: 18 (22 1529)  Blood Pressure: (!) 181/115 (22 1530)  SpO2: 98 % (22 1529)    Primary Survey: (A) Airway:  Intact  (B) Breathing:  Bilateral breath sounds  (C) Circulation: Pulses:   normal  (D) Disabliity:  GCS Total:  15  (E) Expose:  Completed    Secondary Survey: (Click on Physical Exam tab above)  Physical Exam  Constitutional:       General: She is not in acute distress  Appearance: Normal appearance  She is well-developed  HENT:      Head: Normocephalic and atraumatic  Right Ear: External ear normal       Left Ear: External ear normal       Ears:        Comments: Approximately 3 cm in length of an earlobe laceration that wraps from anterior to posterior with an avulsion injury  No significant active bleeding  Mouth/Throat:      Pharynx: No oropharyngeal exudate  Eyes:      Extraocular Movements: Extraocular movements intact  Cardiovascular:      Rate and Rhythm: Normal rate and regular rhythm  Heart sounds: Normal heart sounds  Pulmonary:      Effort: Pulmonary effort is normal       Breath sounds: Normal breath sounds  Abdominal:      General: Bowel sounds are normal       Palpations: Abdomen is soft  Tenderness: There is no abdominal tenderness  Musculoskeletal:         General: Normal range of motion  Cervical back: Normal range of motion  Comments: GCS 15, full ROM of bilateral upper and lower extremities  Airway intact, bilateral breath sounds, palpable pulses  No active bleeding  No bony point tenderness in extremities, chest, abdomen or c/t,l spine unless otherwise noted  No crepitus, abdomen soft/non tender  Chest wall soft non tender with no deformities  Pelvis stable  Skin:     General: Skin is warm  Capillary Refill: Capillary refill takes less than 2 seconds  Neurological:      General: No focal deficit present  Mental Status: She is alert and oriented to person, place, and time  Psychiatric:         Mood and Affect: Mood normal       Comments: Mild agitation         Cervical spine cleared by clinical criteria?  No (imaging required)    Patient did not tolerate C-collar  C-spine cleared upon negative CT  Invasive Devices  Report    None                 Lab Results:   Results Reviewed     Procedure Component Value Units Date/Time    Comprehensive metabolic panel [[id number]]  (Abnormal) Collected: 11/05/22 1619    Lab Status: Final result Specimen: Blood from Arm, Right Updated: 11/05/22 1647     Sodium 144 mmol/L      Potassium 3 5 mmol/L      Chloride 107 mmol/L      CO2 26 mmol/L      ANION GAP 11 mmol/L      BUN 10 mg/dL      Creatinine 0 82 mg/dL      Glucose 81 mg/dL      Calcium 9 9 mg/dL      AST 99 U/L      ALT 96 U/L      Alkaline Phosphatase 68 U/L      Total Protein 8 2 g/dL      Albumin 4 8 g/dL      Total Bilirubin 0 45 mg/dL      eGFR 86 ml/min/1 73sq m     Narrative:      [place] guidelines for Chronic Kidney Disease (CKD):   •  Stage 1 with normal or high GFR (GFR > 90 mL/min/1 73 square meters)  •  Stage 2 Mild CKD (GFR = 60-89 mL/min/1 73 square meters)  •  Stage 3A Moderate CKD (GFR = 45-59 mL/min/1 73 square meters)  •  Stage 3B Moderate CKD (GFR = 30-44 mL/min/1 73 square meters)  •  Stage 4 Severe CKD (GFR = 15-29 mL/min/1 73 square meters)  •  Stage 5 End Stage CKD (GFR <15 mL/min/1 73 square meters)  Note: GFR calculation is accurate only with a steady state creatinine    Ethanol [[id number]]  (Abnormal) Collected: 11/05/22 1619    Lab Status: Final result Specimen: Blood from Arm, Right Updated: 11/05/22 1647     Ethanol Lvl 243 mg/dL     Salicylate level [[id number]]  (Normal) Collected: 11/05/22 1619    Lab Status: Final result Specimen: Blood from Arm, Right Updated: 83/43/74 4373     Salicylate Lvl <5 mg/dL     Acetaminophen level-If concentration is detectable, please discuss with medical  on call   [[id number]]  (Abnormal) Collected: 11/05/22 1619    Lab Status: Final result Specimen: Blood from Arm, Right Updated: 11/05/22 1646     Acetaminophen Level <10 ug/mL Protime-INR [[id number]]  (Normal) Collected: 11/05/22 1619    Lab Status: Final result Specimen: Blood from Arm, Right Updated: 11/05/22 1642     Protime 13 2 seconds      INR 1 00    APTT [[id number]]  (Normal) Collected: 11/05/22 1619    Lab Status: Final result Specimen: Blood from Arm, Right Updated: 11/05/22 1642     PTT 23 seconds     CBC and differential [[id number]]  (Abnormal) Collected: 11/05/22 1619    Lab Status: Final result Specimen: Blood from Arm, Right Updated: 11/05/22 1637     WBC 3 73 Thousand/uL      RBC 4 63 Million/uL      Hemoglobin 15 6 g/dL      Hematocrit 46 0 %      MCV 99 fL      MCH 33 7 pg      MCHC 33 9 g/dL      RDW 11 9 %      MPV 10 7 fL      Platelets 671 Thousands/uL      nRBC 0 /100 WBCs      Neutrophils Relative 48 %      Immat GRANS % 0 %      Lymphocytes Relative 44 %      Monocytes Relative 4 %      Eosinophils Relative 3 %      Basophils Relative 1 %      Neutrophils Absolute 1 83 Thousands/µL      Immature Grans Absolute 0 01 Thousand/uL      Lymphocytes Absolute 1 63 Thousands/µL      Monocytes Absolute 0 14 Thousand/µL      Eosinophils Absolute 0 10 Thousand/µL      Basophils Absolute 0 02 Thousands/µL                  Imaging Studies:   Direct to CT: No  CT spine cervical without contrast   Final Result by Nerissa Gerber MD (11/05 1716)      No cervical spine fracture or traumatic malalignment  Workstation performed: [id number]         CT head without contrast   Final Result by Nerissa Gerber MD (11/05 1658)      No acute intracranial abnormality  Workstation performed: [id number]         XR chest 1 view portable   ED Interpretation by Irene Mills PA-C (11/05 1613)   No obvious acute cardiopulmonary disease      Final Result by Silas Beal MD (11/06 1134)      No acute cardiopulmonary disease                    Workstation performed: [id number]               Procedures  POC FAST US    Date/Time: 11/6/2022 4:26 PM  Performed by: Asuncion Turner PA-C  Authorized by: Asuncion Turner PA-C     Patient location:  ED  Other Assisting Provider: Yes (comment)    Procedure details:     Indications: blunt abdominal trauma and blunt chest trauma      Assess for:  Intra-abdominal fluid, pericardial effusion and pneumothorax    Technique: extended FAST      Views obtained:  Heart - Pericardial sac, RUQ - Mckeon's Pouch, LUQ - Splenorenal space and Suprapubic - Pouch of Domo  FAST Findings:     RUQ (Hepatorenal) free fluid: absent      LUQ (Splenorenal) free fluid: absent      Suprapubic free fluid: absent      Cardiac wall motion: identified      Pericardial effusion: absent    extended FAST (Pulmonary) findings:     Left lung sliding: Present      Right lung sliding: Present      Left pleural effusion: Absent      Right pleural effusion: Absent    Interpretation:     Impressions: negative    Laceration repair    Date/Time: 11/6/2022 4:26 PM  Performed by: Asuncion Turner PA-C  Authorized by: Asuncion Turner PA-C   Consent: Verbal consent obtained    Consent given by: patient and parent  Patient understanding: patient states understanding of the procedure being performed  Patient identity confirmed: verbally with patient  Body area: head/neck  Location details: right ear  Laceration length: 4 cm  Foreign bodies: no foreign bodies  Tendon involvement: none  Nerve involvement: none  Vascular damage: no  Anesthesia: local infiltration    Anesthesia:  Local Anesthetic: lidocaine 1% without epinephrine  Anesthetic total: 8 mL    Sedation:  Patient sedated: no      Wound Dehiscence:  Superficial Wound Dehiscence: simple closure      Procedure Details:  Irrigation solution: saline  Amount of cleaning: standard  Skin closure: 5-0 nylon  Number of sutures: 5  Technique: simple  Patient tolerance: patient tolerated the procedure well with no immediate complications               ED Course           MDM  Number of Diagnoses or Management Options  Ear lobe laceration  Diagnosis management comments: Patient presented after a fall last night complaining of a right ear laceration  Patient without any other signs of trauma  Although patient was intoxicated she was speaking in complete sentences and was alert and oriented to person place and time  She adamantly denies SI/HI  Denies any other drug ingestion  Trauma evaluation call due to provided discretion and patient reporting to have drank a considerable amount of alcohol last night and having a fall with head strike and concern for possible intracranial injury  Laceration of your closed doubt significant complication  Patient informed of potential for poor cosmetic outcome and she was agreeable to still have a closed in the ED  Patient observed in the ED for hours  She called and had a sober ride home that is capable of watching her  Offered crisis consult for alcohol session which she declined  Return precautions to the ED were provided  All of her questions were answered and she was agreeable to plan  Portions of the record may have been created with voice recognition software  Occasional wrong word or "sound a like" substitutions may have occurred due to the inherent limitations of voice recognition software  Read the chart carefully and recognize, using context, where substitutions have occurred  Disposition  [facility] Transfer: No  Final diagnoses:   Ear lobe laceration     Time reflects when diagnosis was documented in both MDM as applicable and the Disposition within this note     Time User Action Codes Description Comment    11/5/2022  6:27 PM [address] [[id number]] Ear lobe laceration       ED Disposition     ED Disposition   Discharge    Condition   Stable    Date/Time   Sat Nov 5, 2022  6:27 PM    Comment   Adela Goodell discharge to home/self care                 Follow-up Information    None       Discharge Medication List as of 11/5/2022  6:27 PM      CONTINUE these medications which have NOT CHANGED    Details   erythromycin (ILOTYCIN) ophthalmic ointment Place a 1/2 inch ribbon of ointment into the lower eyelid  , Normal      HYDROcodone-acetaminophen (NORCO) 5-325 mg per tablet Take 1 tablet by mouth every 6 (six) hours as needed for pain for up to 8 dosesMax Daily Amount: 4 tablets, Starting Sun 11/8/2020, Normal      ondansetron (Zofran ODT) 4 mg disintegrating tablet Take 1 tablet (4 mg total) by mouth every 6 (six) hours as needed for nausea or vomiting for up to 2 days, Starting Wed 10/12/2022, Until Fri 10/14/2022 at 2359, Normal           No discharge procedures on file      PDMP Review       Value Time User    PDMP Reviewed  Yes 10/12/2022 12:17 PM Bakari Barlow DO          ED Provider  Electronically Signed by         Dominic Huitron PA-C  11/06/22 9430

## 2022-12-09 ENCOUNTER — OFFICE VISIT (OUTPATIENT)
Dept: GASTROENTEROLOGY | Facility: CLINIC | Age: 47
End: 2022-12-09

## 2022-12-09 VITALS
DIASTOLIC BLOOD PRESSURE: 100 MMHG | BODY MASS INDEX: 23.3 KG/M2 | WEIGHT: 145 LBS | SYSTOLIC BLOOD PRESSURE: 152 MMHG | HEIGHT: 66 IN | RESPIRATION RATE: 18 BRPM

## 2022-12-09 DIAGNOSIS — B18.2 CHRONIC HEPATITIS C WITHOUT HEPATIC COMA (HCC): Primary | ICD-10-CM

## 2022-12-09 DIAGNOSIS — Z12.11 COLON CANCER SCREENING: ICD-10-CM

## 2022-12-09 RX ORDER — QUETIAPINE FUMARATE 50 MG/1
TABLET, FILM COATED ORAL
COMMUNITY
Start: 2022-12-08

## 2022-12-09 NOTE — LETTER
December 9, 2022     Sheryle Bail, [address]    Patient: Pam Sheffield   YOB: 1975   Date of Visit: 12/9/2022       Dear Dr Nuris Waldrop: Thank you for referring Terrence Uribe to me for evaluation  Below are my notes for this consultation  If you have questions, please do not hesitate to call me  I look forward to following your patient along with you  Sincerely,        Parth Yoon MD        CC: No Recipients  Parth Yoon MD  12/9/2022  8:43 AM  Incomplete  [facility]    Dear Dr Nuris Waldrop,    I had the pleasure of seeing your patient Pam Sheffield in the office today and I thank you for this kind referral        Chief Complaint: Hepatitis C      HPI:  Pam Sheffield is a 41 y o  female who presents with history of hepatitis C diagnosed in June 2015 on routine blood work  She was positive for the antibody  The patient has a past history of opioid dependence but has not used drugs in many many years  She had been on Suboxone but has not used this in many years  The patient was recently seen for an alcohol induced fall  However according her she does not drink every day  She drinks only occasionally when her hip pain gets very bad  The patient needs a hip replacement but needs to have her other issues sorted out first   She denies any symptomatology referable to her liver  She denies any abdominal pain, change in the color of urine or stool, jaundice, change in the sleep-wake cycle, swelling, or any other problems  She has no GI symptomatology whatsoever  She has no family history of known intestinal disease  The patient underwent blood testing on November 5  PT/INR were normal   Complete metabolic profile revealed an AST of 99, ALT of 96  CBC showed a WBC of 3 73  Platelet count was 442,515  CT scan done on June 26 showed a normal liver  Patient has no other complaints at the present time         Review of Systems:   Constitutional: No fever or chills, feels well, no tiredness, no recent weight gain or weight loss  HENT: No complaints of earache, no hearing loss, no nosebleeds, no nasal discharge, no sore throat, no hoarseness  Eyes: No complaints of eye pain, no red eyes, no discharge from eyes, no itchy eyes  Cardiovascular: No complaints of slow heart rate, no fast heart rate, no chest pain, no palpitations, no leg claudication, no lower extremity edema  Respiratory: No complaints of shortness of breath, no wheezing, no cough, no SOB on exertion, no orthopnea  Gastrointestinal: As noted in HPI  Genitourinary: No complaints of dysuria, no incontinence, no hesitancy, no nocturia  Musculoskeletal: As per HPI  Neurological: No complaints of headache, no confusion, no convulsions, no numbness or tingling, no dizziness or fainting, no limb weakness, no difficulty walking  Skin: No complaints of skin rash or skin lesions, no itching, no skin wound, no dry skin  Hematological/Lymphatic: No complaints of swollen glands, does not bleed easy  Allergic/Immunologic: No immunocompromised state  Endocrine:  No complaints of polyuria, no polydipsia  Psychiatric/Behavioral: is not suicidal, no sleep disturbances, no anxiety or depression, no change in personality, no emotional problems         Historical Information   Past Medical History:   Diagnosis Date   • Infectious viral hepatitis      Past Surgical History:   Procedure Laterality Date   •  SECTION       Social History   Social History     Substance and Sexual Activity   Alcohol Use Yes     Social History     Substance and Sexual Activity   Drug Use Never     Social History     Tobacco Use   Smoking Status Every Day   • Packs/day: 1 00   • Types: Cigarettes   Smokeless Tobacco Never     Family History   Problem Relation Age of Onset   • Diabetes Mother    • Heart disease Maternal Grandfather    • Heart disease Paternal Grandfather Current Medications: has a current medication list which includes the following prescription(s): erythromycin, hydrocodone-acetaminophen, ondansetron, and quetiapine  Vital Signs: /100   Resp 18   Ht 5' 6" (1 676 m)   Wt 65 8 kg (145 lb)   BMI 23 40 kg/m²     Physical Exam:   Constitutional  General Appearance: No acute distress, well appearing and well nourished  Head  Normocephalic  Eyes  Conjunctivae and lids: No swelling, erythema, or discharge  Pupils and irises: Equal, round and reactive to light  Ears, Nose, Mouth, and Throat  External inspection of ears and nose: Normal  Nasal mucosa, septum and turbinates: Normal without edema or erythema/   Oropharynx: Normal with no erythema, edema, exudate or lesions  Neck  Normal range of motion  Neck supple  Cardiovascular  Auscultation of the heart: Normal rate and rhythm, normal S1 and S2 without murmurs  Examination of the extremities for edema and/or varicosities: Normal  Pulmonary/Chest  Respiratory effort: No increased work of breathing or signs of respiratory distress  Auscultation of lungs: Clear to auscultation, equal breath sounds bilaterally, no wheezes, rales, no rhonchi  Abdomen  Abdomen: Non-tender, no masses  Liver and spleen: No hepatomegaly or splenomegaly  Musculoskeletal  Gait and station: normal   Digits and Nails: normal without clubbing or cyanosis  Inspection/palpation of joints, bones, and muscles: Normal  Neurological  No nystagmus or asterixis  Skin  Skin and subcutaneous tissue: Normal without rashes or lesions  Lymphatic  Palpation of the lymph nodes in neck: No lymphadenopathy     Psychiatric  Orientation to person, place and time: Normal   Mood and affect: Normal          Labs:   Lab Results   Component Value Date    ALT 96 (H) 11/05/2022    AST 99 (H) 11/05/2022    BUN 10 11/05/2022    CALCIUM 9 9 11/05/2022     11/05/2022    CO2 26 11/05/2022    CREATININE 0 82 11/05/2022    HCT 46 0 11/05/2022    HGB 15 6 (H) 11/05/2022    HGBA1C 5 1 01/11/2022     (L) 11/05/2022    K 3 5 11/05/2022    WBC 3 73 (L) 11/05/2022         X-Rays & Procedures:   No orders to display         ______________________________________________________________________      Assessment & Plan:      Diagnoses and all orders for this visit:    Chronic hepatitis C without hepatic coma ([facility] [address] )  -     Ambulatory Referral to Hepatology; Future    Colon cancer screening  -     Colonoscopy; Future      The patient will be referred to our hepatology team for definitive treatment of her hepatitis C  She will undergo initial average risk screening colonoscopy  She will seek to avoid alcohol completely  Further recommendations will be based on study results  I would like to thank you for allowing me to participate in her care                With warmest regards,    Lou Swan MD, [facility]

## 2022-12-09 NOTE — PROGRESS NOTES
[facility]    Dear Dr Ghazal Callahan,    I had the pleasure of seeing your patient Dione Vega in the office today and I thank you for this kind referral        Chief Complaint: Hepatitis C      HPI:  Dione Vega is a 41 y o  female who presents with history of hepatitis C diagnosed in June 2015 on routine blood work  She was positive for the antibody  The patient has a past history of opioid dependence but has not used drugs in many many years  She had been on Suboxone but has not used this in many years  The patient was recently seen for an alcohol induced fall  However according her she does not drink every day  She drinks only occasionally when her hip pain gets very bad  The patient needs a hip replacement but needs to have her other issues sorted out first   She denies any symptomatology referable to her liver  She denies any abdominal pain, change in the color of urine or stool, jaundice, change in the sleep-wake cycle, swelling, or any other problems  She has no GI symptomatology whatsoever  She has no family history of known intestinal disease  The patient underwent blood testing on November 5  PT/INR were normal   Complete metabolic profile revealed an AST of 99, ALT of 96  CBC showed a WBC of 3 73  Platelet count was 380,302  CT scan done on June 26 showed a normal liver  Patient has no other complaints at the present time         Review of Systems:   Constitutional: No fever or chills, feels well, no tiredness, no recent weight gain or weight loss  HENT: No complaints of earache, no hearing loss, no nosebleeds, no nasal discharge, no sore throat, no hoarseness  Eyes: No complaints of eye pain, no red eyes, no discharge from eyes, no itchy eyes  Cardiovascular: No complaints of slow heart rate, no fast heart rate, no chest pain, no palpitations, no leg claudication, no lower extremity edema     Respiratory: No complaints of shortness of breath, no wheezing, no cough, no SOB on exertion, no orthopnea  Gastrointestinal: As noted in HPI  Genitourinary: No complaints of dysuria, no incontinence, no hesitancy, no nocturia  Musculoskeletal: As per HPI  Neurological: No complaints of headache, no confusion, no convulsions, no numbness or tingling, no dizziness or fainting, no limb weakness, no difficulty walking  Skin: No complaints of skin rash or skin lesions, no itching, no skin wound, no dry skin  Hematological/Lymphatic: No complaints of swollen glands, does not bleed easy  Allergic/Immunologic: No immunocompromised state  Endocrine:  No complaints of polyuria, no polydipsia  Psychiatric/Behavioral: is not suicidal, no sleep disturbances, no anxiety or depression, no change in personality, no emotional problems  Historical Information   Past Medical History:   Diagnosis Date   • Infectious viral hepatitis      Past Surgical History:   Procedure Laterality Date   •  SECTION       Social History   Social History     Substance and Sexual Activity   Alcohol Use Yes     Social History     Substance and Sexual Activity   Drug Use Never     Social History     Tobacco Use   Smoking Status Every Day   • Packs/day: 1 00   • Types: Cigarettes   Smokeless Tobacco Never     Family History   Problem Relation Age of Onset   • Diabetes Mother    • Heart disease Maternal Grandfather    • Heart disease Paternal Grandfather          Current Medications: has a current medication list which includes the following prescription(s): erythromycin, hydrocodone-acetaminophen, ondansetron, and quetiapine  Vital Signs: /100   Resp 18   Ht 5' 6" (1 676 m)   Wt 65 8 kg (145 lb)   BMI 23 40 kg/m²     Physical Exam:   Constitutional  General Appearance: No acute distress, well appearing and well nourished  Head  Normocephalic  Eyes  Conjunctivae and lids: No swelling, erythema, or discharge  Pupils and irises: Equal, round and reactive to light     Ears, Nose, Mouth, and Throat  External inspection of ears and nose: Normal  Nasal mucosa, septum and turbinates: Normal without edema or erythema/   Oropharynx: Normal with no erythema, edema, exudate or lesions  Neck  Normal range of motion  Neck supple  Cardiovascular  Auscultation of the heart: Normal rate and rhythm, normal S1 and S2 without murmurs  Examination of the extremities for edema and/or varicosities: Normal  Pulmonary/Chest  Respiratory effort: No increased work of breathing or signs of respiratory distress  Auscultation of lungs: Clear to auscultation, equal breath sounds bilaterally, no wheezes, rales, no rhonchi  Abdomen  Abdomen: Non-tender, no masses  Liver and spleen: No hepatomegaly or splenomegaly  Musculoskeletal  Gait and station: normal   Digits and Nails: normal without clubbing or cyanosis  Inspection/palpation of joints, bones, and muscles: Normal  Neurological  No nystagmus or asterixis  Skin  Skin and subcutaneous tissue: Normal without rashes or lesions  Lymphatic  Palpation of the lymph nodes in neck: No lymphadenopathy  Psychiatric  Orientation to person, place and time: Normal   Mood and affect: Normal          Labs:   Lab Results   Component Value Date    ALT 96 (H) 11/05/2022    AST 99 (H) 11/05/2022    BUN 10 11/05/2022    CALCIUM 9 9 11/05/2022     11/05/2022    CO2 26 11/05/2022    CREATININE 0 82 11/05/2022    HCT 46 0 11/05/2022    HGB 15 6 (H) 11/05/2022    HGBA1C 5 1 01/11/2022     (L) 11/05/2022    K 3 5 11/05/2022    WBC 3 73 (L) 11/05/2022         X-Rays & Procedures:   No orders to display         ______________________________________________________________________      Assessment & Plan:      Diagnoses and all orders for this visit:    Chronic hepatitis C without hepatic coma ([facility] )  -     Ambulatory Referral to Hepatology; Future    Colon cancer screening  -     Colonoscopy;  Future      The patient will be referred to our hepatology team for definitive treatment of her hepatitis C  She will undergo initial average risk screening colonoscopy  She will seek to avoid alcohol completely  Further recommendations will be based on study results  I would like to thank you for allowing me to participate in her care                With warmest regards,    Kingston Chen MD, [facility]

## 2022-12-09 NOTE — PATIENT INSTRUCTIONS
Scheduled date of colonoscopy (as of today): 3/22/23  Physician performing colonoscopy: Mery  Location of colonoscopy: [facility]  Bowel prep reviewed with patient: Miralax  Instructions reviewed with patient by: Fatimah HANNA  Clearances:

## 2022-12-12 ENCOUNTER — TELEPHONE (OUTPATIENT)
Dept: GASTROENTEROLOGY | Facility: CLINIC | Age: 47
End: 2022-12-12

## 2022-12-12 DIAGNOSIS — B18.2 CHRONIC HEPATITIS C WITHOUT HEPATIC COMA (HCC): Primary | ICD-10-CM

## 2022-12-12 NOTE — TELEPHONE ENCOUNTER
----- Message from Parth Yoon MD sent at 12/9/2022  8:44 AM EST -----  Good morning, I am referring this patient for hepatitis C work-up and treatment  She was diagnosed in 2015  At that time she was actively using opioids  She has not done so in many years  As always I appreciate your help

## 2022-12-14 NOTE — TELEPHONE ENCOUNTER
Patient rescheduled to 12/19/22 with KAUSHIK Mosqueda for hep c treatment  She will complete additional labs/ US

## 2022-12-19 ENCOUNTER — OFFICE VISIT (OUTPATIENT)
Dept: GASTROENTEROLOGY | Facility: CLINIC | Age: 47
End: 2022-12-19

## 2022-12-19 VITALS
BODY MASS INDEX: 22.31 KG/M2 | WEIGHT: 138.8 LBS | DIASTOLIC BLOOD PRESSURE: 110 MMHG | SYSTOLIC BLOOD PRESSURE: 160 MMHG | TEMPERATURE: 96 F | HEIGHT: 66 IN | HEART RATE: 68 BPM | OXYGEN SATURATION: 99 %

## 2022-12-19 DIAGNOSIS — Z00.00 HEALTHCARE MAINTENANCE: ICD-10-CM

## 2022-12-19 DIAGNOSIS — D69.6 THROMBOCYTOPENIA (HCC): ICD-10-CM

## 2022-12-19 DIAGNOSIS — R79.89 ELEVATED LFTS: ICD-10-CM

## 2022-12-19 DIAGNOSIS — B18.2 CHRONIC HEPATITIS C WITHOUT HEPATIC COMA (HCC): Primary | ICD-10-CM

## 2022-12-19 NOTE — PROGRESS NOTES
[facility] - Outpatient Follow-up Note  Evy Kaminski 41 y o  female MRN: [id number]  Encounter: [id number]          ASSESSMENT AND PLAN:      1  Chronic hepatitis C without hepatic coma ([facility] [address] )  2  [facility] )  3  Elevated LFTs  Patient Hep C antibody positive with mild-moderately elevated transaminases since 2013  Presumed to have chronic HCV infection x23 years, but will order HCVRNA to confirm  Discussed the pathophysiology of hepatitis C in great detail  Patient is interested and agreeable to being evaluated for treatment at this time  Our hepatology nurse has ordered serologies as part of a pre-treatment evaluation to r/o coinfection with HIV and/or hepatitis B, as well as, to assess for hep A and hep B immunity  Patient gave verbal consent for HIV testing  Patient will also complete an abdominal ultrasound with elastography to further evaluate for advanced fibrosis/cirrhosis, particularly given thrombocytopenia although CT from 6/2020 without cirrhotic morphology or radiographic evidence to suggest pHTN  Discussed multiple treatment options (Lisa Gearing, Harvoni, Vosevi etc ), as well as, their varying lengths of treatment and side-effect profiles  Patient is aware of the importance of taking medication as prescribed to completion, as well as, the need for routine serologies both during and after treatment  Patient will require a HCV viral load 12 weeks after the completion of treatment to ensure SVR  Patient was counseled regarding measures to prevent the spread of hepatitis C advised to avoid EtOH during treatment  Congratulated and encouraged patient to continue with her efforts towards abstinence (IVDU)  If PALAFOX Fibrosure or elastography reveal advanced fibrosis, would recommend repeating 3-6 months after achieving SVR to evaluate for regeneration possibly resulting in improved LSM  - Hepatitis C antibody; Future    [address] [place] [address] maintenance  Patient is due for CRC screening  Colonoscopy previously ordered by Dr Yousif Paredes and scheduled for 3/2023  Follow-up in 3 months     ______________________________________________________________________    SUBJECTIVE: Patient is a 41 y o  female with PMH significant for anxiety, insomnia, hyperlipidemia, renal calculus, and osteoarthritis of the left hip who presents today to discuss treatment for chronic hepatitis C infection  Patient states she was first aware she had hepatitis C approximately 23 years prior  She sought treatment in , but was informed that her insurance would not cover it  She is treatment naïve  She does have a history of IV drug use, but reports maintain sobriety for at least 15 years  She was also on Suboxone for short period, but reports discontinuing this 9 years prior with maintain sobriety  Per chart review, she is noted to have mild-moderately elevated transaminases since  with most recent CMP on 2022 with AST 99/ALT 96  Her labs are also notable for thrombocytopenia (114 K)  CT A/P in 2022 without cirrhotic morphology or evidence to suggest portal hypertension  Denies pruritus, confusion, dark urine, bruising easily, yellowing of the eyes and/or skin, new/worsening abdominal distension or swelling of the lower extremities, abdominal pain, overt evidence of GI bleeding (hematemesis, coffee-ground emesis, hematochezia, melena), or unintentional weight loss  REVIEW OF SYSTEMS IS OTHERWISE NEGATIVE        Historical Information   Past Medical History:   Diagnosis Date   • Infectious viral hepatitis      Past Surgical History:   Procedure Laterality Date   •  SECTION       Social History   Social History     Substance and Sexual Activity   Alcohol Use Yes     Social History     Substance and Sexual Activity   Drug Use Never     Social History     Tobacco Use   Smoking Status Every Day   • Packs/day: 1 00   • Types: Cigarettes   Smokeless Tobacco Never     Family History   Problem Relation Age of Onset   • Diabetes Mother    • Heart disease Maternal Grandfather    • Heart disease Paternal Grandfather        Meds/Allergies       Current Outpatient Medications:   •  QUEtiapine (SEROquel) 50 mg tablet  •  erythromycin (ILOTYCIN) ophthalmic ointment  •  HYDROcodone-acetaminophen (NORCO) 5-325 mg per tablet  •  ondansetron (Zofran ODT) 4 mg disintegrating tablet    No Known Allergies        Objective     Blood pressure (!) 160/110, pulse 68, temperature (!) 96 °F (35 6 °C), temperature source Tympanic, height 5' 6" (1 676 m), weight 63 kg (138 lb 12 8 oz), SpO2 99 %  Body mass index is 22 4 kg/m²  PHYSICAL EXAM:      General Appearance:   Alert, cooperative, no distress   HEENT:   Normocephalic, atraumatic, anicteric  Neck:  Supple, symmetrical, trachea midline   Lungs:   Clear to auscultation bilaterally; no rales, rhonchi or wheezing; respirations unlabored    Heart[de-identified]   Regular rate and rhythm; no murmur, rub, or gallop  Abdomen:   Soft, non-tender, non-distended; normal bowel sounds; no masses, no organomegaly    Genitalia:   Deferred    Rectal:   Deferred    Extremities:  No palmar erythema, cyanosis, clubbing or edema    Pulses:  2+ and symmetric    Skin:  No spider angiomas, jaundice, rashes, or lesions    Lymph nodes:  No palpable cervical lymphadenopathy        Lab Results:   No visits with results within 1 Day(s) from this visit     Latest known visit with results is:   Admission on 11/05/2022, Discharged on 11/05/2022   Component Date Value   • WBC 11/05/2022 3 73 (L)    • RBC 11/05/2022 4 63    • Hemoglobin 11/05/2022 15 6 (H)    • Hematocrit 11/05/2022 46 0    • MCV 11/05/2022 99 (H)    • MCH 11/05/2022 33 7    • MCHC 11/05/2022 33 9    • RDW 11/05/2022 11 9    • MPV 11/05/2022 10 7    • Platelets 30/31/2831 114 (L)    • nRBC 11/05/2022 0    • Neutrophils Relative 11/05/2022 48    • Immat GRANS % 11/05/2022 0    • Lymphocytes Relative 11/05/2022 44    • Monocytes Relative 11/05/2022 4    • Eosinophils Relative 11/05/2022 3    • Basophils Relative 11/05/2022 1    • Neutrophils Absolute 11/05/2022 1 83 (L)    • Immature Grans Absolute 11/05/2022 0 01    • Lymphocytes Absolute 11/05/2022 1 63    • Monocytes Absolute 11/05/2022 0 14 (L)    • Eosinophils Absolute 11/05/2022 0 10    • Basophils Absolute 11/05/2022 0 02    • Sodium 11/05/2022 144    • Potassium 11/05/2022 3 5    • Chloride 11/05/2022 107    • CO2 11/05/2022 26    • ANION GAP 11/05/2022 11    • BUN 11/05/2022 10    • Creatinine 11/05/2022 0 82    • Glucose 11/05/2022 81    • Calcium 11/05/2022 9 9    • AST 11/05/2022 99 (H)    • ALT 11/05/2022 96 (H)    • Alkaline Phosphatase 11/05/2022 68    • Total Protein 11/05/2022 8 2    • Albumin 11/05/2022 4 8    • Total Bilirubin 11/05/2022 0 45    • eGFR 11/05/2022 86    • Protime 11/05/2022 13 2    • INR 11/05/2022 1 00    • PTT 11/05/2022 23    • Ethanol Lvl 65/84/8079 420 (H)    • Salicylate Lvl 98/88/2621 <5    • Acetaminophen Level 11/05/2022 <10 (L)    • Ventricular Rate 11/05/2022 95    • Atrial Rate 11/05/2022 93    • QRSD Interval 11/05/2022 82    • QT Interval 11/05/2022 392    • QTC Interval 11/05/2022 492    • QRS Axis 11/05/2022 74    • T Wave Axis 11/05/2022 65          Radiology Results:   No results found

## 2022-12-28 NOTE — ED PROVIDER NOTES
Administrative Addendum:    Being that this patient was seen and discharged on 11/5/2022, the documentation of the procedure also occurred on 11/5/2022  The procedure date of 11/6/22 was entered in error  PARAS Martin ,   12/28/22 1048

## 2023-01-04 ENCOUNTER — TELEPHONE (OUTPATIENT)
Dept: GASTROENTEROLOGY | Facility: CLINIC | Age: 48
End: 2023-01-04

## 2023-01-04 NOTE — TELEPHONE ENCOUNTER
Called pt to remind them to complete remainder of bloodwork that was ordered from [address] in December  Pt agreed and stated she will take care of them and call back if needed

## 2023-01-11 ENCOUNTER — HOSPITAL ENCOUNTER (OUTPATIENT)
Dept: ULTRASOUND IMAGING | Facility: HOSPITAL | Age: 48
Discharge: HOME | End: 2023-01-11

## 2023-01-11 DIAGNOSIS — B18.2 CHRONIC HEPATITIS C WITHOUT HEPATIC COMA (HCC): ICD-10-CM

## 2023-01-12 NOTE — TELEPHONE ENCOUNTER
Called pt to remind them to complete remainder of bloodwork that was ordered from [address] in December  Pt agreed and stated she will take care of them and call back if needed

## 2023-03-21 RX ORDER — SODIUM CHLORIDE, SODIUM LACTATE, POTASSIUM CHLORIDE, CALCIUM CHLORIDE 600; 310; 30; 20 MG/100ML; MG/100ML; MG/100ML; MG/100ML
125 INJECTION, SOLUTION INTRAVENOUS CONTINUOUS
OUTPATIENT
Start: 2023-03-21

## 2023-03-21 RX ORDER — LIDOCAINE HYDROCHLORIDE 10 MG/ML
0.5 INJECTION, SOLUTION EPIDURAL; INFILTRATION; INTRACAUDAL; PERINEURAL ONCE AS NEEDED
OUTPATIENT
Start: 2023-03-21

## 2023-03-30 ENCOUNTER — TELEPHONE (OUTPATIENT)
Dept: GASTROENTEROLOGY | Facility: CLINIC | Age: 48
End: 2023-03-30

## 2023-06-10 ENCOUNTER — APPOINTMENT (EMERGENCY)
Dept: RADIOLOGY | Facility: HOSPITAL | Age: 48
End: 2023-06-10
Payer: COMMERCIAL

## 2023-06-10 ENCOUNTER — HOSPITAL ENCOUNTER (EMERGENCY)
Facility: HOSPITAL | Age: 48
Discharge: HOME | End: 2023-06-11
Attending: EMERGENCY MEDICINE | Admitting: EMERGENCY MEDICINE
Payer: COMMERCIAL

## 2023-06-10 DIAGNOSIS — M25.531 RIGHT WRIST PAIN: Primary | ICD-10-CM

## 2023-06-10 DIAGNOSIS — M79.641 RIGHT HAND PAIN: ICD-10-CM

## 2023-06-10 PROCEDURE — 99283 EMERGENCY DEPT VISIT LOW MDM: CPT

## 2023-06-10 PROCEDURE — 73130 X-RAY EXAM OF HAND: CPT

## 2023-06-10 PROCEDURE — 73110 X-RAY EXAM OF WRIST: CPT

## 2023-06-10 RX ORDER — OXYCODONE HYDROCHLORIDE 5 MG/1
5 TABLET ORAL ONCE
Status: COMPLETED | OUTPATIENT
Start: 2023-06-11 | End: 2023-06-11

## 2023-06-10 RX ORDER — IBUPROFEN 400 MG/1
800 TABLET ORAL ONCE
Status: DISCONTINUED | OUTPATIENT
Start: 2023-06-10 | End: 2023-06-11 | Stop reason: HOSPADM

## 2023-06-10 RX ORDER — ACETAMINOPHEN 325 MG/1
650 TABLET ORAL ONCE
Status: DISCONTINUED | OUTPATIENT
Start: 2023-06-10 | End: 2023-06-11 | Stop reason: HOSPADM

## 2023-06-11 VITALS
WEIGHT: 140 LBS | RESPIRATION RATE: 18 BRPM | OXYGEN SATURATION: 95 % | BODY MASS INDEX: 22.5 KG/M2 | SYSTOLIC BLOOD PRESSURE: 183 MMHG | TEMPERATURE: 97.9 F | HEART RATE: 118 BPM | DIASTOLIC BLOOD PRESSURE: 104 MMHG | HEIGHT: 66 IN

## 2023-06-11 RX ADMIN — OXYCODONE HYDROCHLORIDE 5 MG: 5 TABLET ORAL at 00:01

## 2023-06-11 NOTE — DISCHARGE INSTRUCTIONS
Recommend Tylenol and ibuprofen as needed for pain  May also apply ice over the splint    Remain in splint until you follow-up with orthopedics    Return for any worsening symptoms including worsening pain, numbness or tingling, or any other concerning symptoms

## 2023-06-11 NOTE — ED PROVIDER NOTES
History  Chief Complaint   Patient presents with   • Wrist Injury     Left wrist injury      29-year-old female presents via EMS with police for evaluation of right wrist and hand pain  Patient states that the police officers placed her in handcuffs since has been having pain in the right hand/wrist   Pain is made worse with movement  She denies any associated numbness or tingling  Range of motion is limited secondary to pain  She denies any trauma or falls  She denies any other complaints at this time  Prior to Admission Medications   Prescriptions Last Dose Informant Patient Reported? Taking? HYDROcodone-acetaminophen (NORCO) 5-325 mg per tablet  Self No No   Sig: Take 1 tablet by mouth every 6 (six) hours as needed for pain for up to 8 dosesMax Daily Amount: 4 tablets   Patient not taking: Reported on 2022   QUEtiapine (SEROquel) 50 mg tablet  Self Yes No   erythromycin (ILOTYCIN) ophthalmic ointment  Self No No   Sig: Place a 1/2 inch ribbon of ointment into the lower eyelid  Patient not taking: Reported on 2022   ondansetron (Zofran ODT) 4 mg disintegrating tablet   No No   Sig: Take 1 tablet (4 mg total) by mouth every 6 (six) hours as needed for nausea or vomiting for up to 2 days      Facility-Administered Medications: None       Past Medical History:   Diagnosis Date   • Infectious viral hepatitis        Past Surgical History:   Procedure Laterality Date   •  SECTION         Family History   Problem Relation Age of Onset   • Diabetes Mother    • Heart disease Maternal Grandfather    • Heart disease Paternal Grandfather      I have reviewed and agree with the history as documented  E-Cigarette/Vaping     E-Cigarette/Vaping Substances     Social History     Tobacco Use   • Smoking status: Every Day     Packs/day:  00     Types: Cigarettes   • Smokeless tobacco: Never   Substance Use Topics   • Alcohol use:  Yes   • Drug use: Never       Review of Systems Constitutional: Negative for chills and fever  HENT: Negative for ear pain and sore throat  Eyes: Negative for pain and visual disturbance  Respiratory: Negative for cough and shortness of breath  Cardiovascular: Negative for chest pain and palpitations  Gastrointestinal: Negative for abdominal pain and vomiting  Genitourinary: Negative for dysuria and hematuria  Musculoskeletal: Positive for arthralgias  Negative for back pain  Skin: Negative for color change and rash  Neurological: Negative for seizures and syncope  All other systems reviewed and are negative  Physical Exam  Physical Exam  Vitals and nursing note reviewed  Constitutional:       General: She is not in acute distress  HENT:      Head: Normocephalic and atraumatic  Right Ear: External ear normal       Left Ear: External ear normal       Nose: Nose normal       Mouth/Throat:      Mouth: Mucous membranes are moist    Eyes:      Extraocular Movements: Extraocular movements intact  Conjunctiva/sclera: Conjunctivae normal       Pupils: Pupils are equal, round, and reactive to light  Cardiovascular:      Rate and Rhythm: Regular rhythm  Tachycardia present  Pulses: Normal pulses  Pulmonary:      Effort: Pulmonary effort is normal  No respiratory distress  Breath sounds: No stridor  Musculoskeletal:      Right upper arm: Normal       Right elbow: Normal       Right forearm: Normal       Right wrist: Swelling, tenderness, bony tenderness and snuff box tenderness present  No deformity or crepitus  Normal pulse  Right hand: Swelling, tenderness and bony tenderness present  No deformity  Decreased range of motion  Normal sensation  There is no disruption of two-point discrimination  Normal capillary refill  Normal pulse  Cervical back: Normal range of motion and neck supple  Skin:     General: Skin is warm and dry  Capillary Refill: Capillary refill takes less than 2 seconds  "  Neurological:      General: No focal deficit present  Mental Status: She is alert and oriented to person, place, and time  Sensory: No sensory deficit  Psychiatric:         Mood and Affect: Mood normal          Behavior: Behavior normal          Vital Signs  ED Triage Vitals   Temperature Pulse Respirations Blood Pressure SpO2   06/11/23 0002 06/10/23 2148 06/10/23 2148 06/10/23 2148 06/10/23 2148   97 9 °F (36 6 °C) (!) 118 18 (!) 183/104 95 %      Temp Source Heart Rate Source Patient Position - Orthostatic VS BP Location FiO2 (%)   06/11/23 0002 -- -- -- --   Tympanic          Pain Score       06/10/23 2148       10 - Worst Possible Pain           Vitals:    06/10/23 2148   BP: (!) 183/104   Pulse: (!) 118         Visual Acuity      ED Medications  Medications   acetaminophen (TYLENOL) tablet 650 mg (650 mg Oral Not Given 6/10/23 2211)   ibuprofen (MOTRIN) tablet 800 mg (800 mg Oral Not Given 6/10/23 2212)   oxyCODONE (ROXICODONE) IR tablet 5 mg (5 mg Oral Given 6/11/23 0001)       Diagnostic Studies  Results Reviewed     None                 XR wrist 3+ views RIGHT   ED Interpretation by Karan Sparks MD (06/10 2353)   No acute osseous abnormality      XR hand 3+ views RIGHT   ED Interpretation by Karan Sparks MD (06/10 2353)   No obvious fracture or dislocation                 Procedures  Splint application    Date/Time: 6/10/2023 11:53 PM    Performed by: Karan Sparks MD  Authorized by: Karan Sparks MD  Universal Protocol:  Consent: Verbal consent obtained  Risks and benefits: risks, benefits and alternatives were discussed  Consent given by: patient  Time out: Immediately prior to procedure a \"time out\" was called to verify the correct patient, procedure, equipment, support staff and site/side marked as required    Timeout called at: 6/10/2023 11:40 PM   Patient understanding: patient states understanding of the procedure being performed  Patient identity confirmed: verbally with " patient      Pre-procedure details:     Sensation:  Normal  Procedure details:     Laterality:  Right    Location:  Hand    Hand:  R hand    Splint type:  Thumb spica    Supplies:  Cotton padding and Ortho-Glass  Post-procedure details:     Pain:  Improved    Sensation:  Normal    Patient tolerance of procedure: Tolerated well, no immediate complications             ED Course                                             Medical Decision Making  62-year-old female presents the emergency department for evaluation of right hand and wrist pain  On exam, she is resting comfortably in bed in no acute distress  Has tenderness to palpation on the dorsal aspect of the right hand including snuffbox tenderness, she also has tenderness to palpation over the distal radius  Differential diagnosis includes but is not limited to sprain, strain, contusion, fracture, dislocation, there is no signs of infection  Get x-rays and treat symptomatically  Patient refusing Tylenol and ibuprofen at this time  X-rays were negative for any obvious fracture or dislocation per my interpretation, but given the degree of swelling and snuffbox tenderness, will place patient in thumb spica splint  Will give one-time dose of oxycodone to assist with pain  Patient tolerated splinting well without complication  Pain improved following splinting  She will be discharged home to follow-up with orthopedic surgery, ambulatory referral was placed  She was given strict return precautions  Her tachycardia had resolved by the time of discharge  Right hand pain: acute illness or injury  Right wrist pain: acute illness or injury  Amount and/or Complexity of Data Reviewed  Radiology: ordered and independent interpretation performed  Risk  OTC drugs  Prescription drug management            Disposition  Final diagnoses:   Right wrist pain   Right hand pain     Time reflects when diagnosis was documented in both MDM as applicable and the Disposition within this note     Time User Action Codes Description Comment    6/10/2023 11:51 PM Check, Esme Yeung [O08 [id number]] Right wrist pain     6/10/2023 11:51 PM Check, Esme Yeung [S85 [id number]] Right hand pain       ED Disposition     ED Disposition   Discharge    Condition   Stable    Date/Time   Sat Josue 10, 2023 11:51 PM    Comment   Joann Coupe discharge to home/self care  Follow-up Information     Follow up With Specialties Details Why Contact Info Additional [address] Emergency Department Emergency Medicine  If symptoms worsen [address] [place] [address]  [facility] Emergency Department, [address] [place], [place], Bree Pearson [address] [facility] Schedule an appointment as soon as possible for a visit   [id number] [place] [address] [facility], [address] [place] [address], [place] [address]          Discharge Medication List as of 6/10/2023 11:53 PM      CONTINUE these medications which have NOT CHANGED    Details   erythromycin (ILOTYCIN) ophthalmic ointment Place a 1/2 inch ribbon of ointment into the lower eyelid  , Normal      HYDROcodone-acetaminophen (NORCO) 5-325 mg per tablet Take 1 tablet by mouth every 6 (six) hours as needed for pain for up to 8 dosesMax Daily Amount: 4 tablets, Starting Sun 11/8/2020, Normal      ondansetron (Zofran ODT) 4 mg disintegrating tablet Take 1 tablet (4 mg total) by mouth every 6 (six) hours as needed for nausea or vomiting for up to 2 days, Starting Wed 10/12/2022, Until Fri 10/14/2022 at 2359, Normal      QUEtiapine (SEROquel) 50 mg tablet Starting Thu 12/8/2022, Historical Med                 PDMP Review       Value Time User    PDMP Reviewed  Yes 10/12/2022 12:17 PM Tay Reis DO ED Provider  Electronically Signed by           Rosemary Houser MD  06/11/23 1569

## 2023-06-12 ENCOUNTER — TELEPHONE (OUTPATIENT)
Dept: OBGYN CLINIC | Facility: HOSPITAL | Age: 48
End: 2023-06-12

## 2023-06-12 NOTE — TELEPHONE ENCOUNTER
Patient is being referred to a orthopedics  Please schedule accordingly      [address]   [phone]

## 2023-07-05 ENCOUNTER — TELEPHONE (OUTPATIENT)
Age: 48
End: 2023-07-05

## 2023-07-05 DIAGNOSIS — B18.2 CHRONIC HEPATITIS C WITHOUT HEPATIC COMA (HCC): Primary | ICD-10-CM

## 2023-07-05 NOTE — TELEPHONE ENCOUNTER
Pt called and says she has blood work she is supposed to complete but needs a new order for them as she is past date.  She has a new phone number  [phone]  Please set up new order or call her at the new number when possible

## 2023-10-02 ENCOUNTER — HOSPITAL ENCOUNTER (EMERGENCY)
Facility: HOSPITAL | Age: 48
End: 2023-10-03
Attending: EMERGENCY MEDICINE
Payer: COMMERCIAL

## 2023-10-02 DIAGNOSIS — Z00.8 ENCOUNTER FOR PSYCHOLOGICAL EVALUATION: Primary | ICD-10-CM

## 2023-10-02 LAB
ALBUMIN SERPL BCP-MCNC: 3.9 G/DL (ref 3.5–5)
ALBUMIN SERPL BCP-MCNC: 4.5 G/DL (ref 3.5–5)
ALP SERPL-CCNC: 41 U/L (ref 34–104)
ALP SERPL-CCNC: 49 U/L (ref 34–104)
ALT SERPL W P-5'-P-CCNC: 66 U/L (ref 7–52)
ALT SERPL W P-5'-P-CCNC: 73 U/L (ref 7–52)
AMPHETAMINES SERPL QL SCN: NEGATIVE
ANION GAP SERPL CALCULATED.3IONS-SCNC: 10 MMOL/L
ANION GAP SERPL CALCULATED.3IONS-SCNC: 16 MMOL/L
APAP SERPL-MCNC: <10 UG/ML (ref 10–20)
AST SERPL W P-5'-P-CCNC: 117 U/L (ref 13–39)
AST SERPL W P-5'-P-CCNC: 98 U/L (ref 13–39)
BACTERIA UR QL AUTO: ABNORMAL /HPF
BARBITURATES UR QL: NEGATIVE
BASOPHILS # BLD AUTO: 0.02 THOUSANDS/ÂΜL (ref 0–0.1)
BASOPHILS NFR BLD AUTO: 0 % (ref 0–1)
BENZODIAZ UR QL: NEGATIVE
BILIRUB SERPL-MCNC: 0.58 MG/DL (ref 0.2–1)
BILIRUB SERPL-MCNC: 0.6 MG/DL (ref 0.2–1)
BILIRUB UR QL STRIP: NEGATIVE
BUN SERPL-MCNC: 6 MG/DL (ref 5–25)
BUN SERPL-MCNC: 7 MG/DL (ref 5–25)
CALCIUM SERPL-MCNC: 8.2 MG/DL (ref 8.4–10.2)
CALCIUM SERPL-MCNC: 8.7 MG/DL (ref 8.4–10.2)
CHLORIDE SERPL-SCNC: 104 MMOL/L (ref 96–108)
CHLORIDE SERPL-SCNC: 109 MMOL/L (ref 96–108)
CK SERPL-CCNC: 177 U/L (ref 26–192)
CLARITY UR: CLEAR
CO2 SERPL-SCNC: 21 MMOL/L (ref 21–32)
CO2 SERPL-SCNC: 24 MMOL/L (ref 21–32)
COCAINE UR QL: NEGATIVE
COLOR UR: YELLOW
CREAT SERPL-MCNC: 0.65 MG/DL (ref 0.6–1.3)
CREAT SERPL-MCNC: 0.7 MG/DL (ref 0.6–1.3)
EOSINOPHIL # BLD AUTO: 0.1 THOUSAND/ÂΜL (ref 0–0.61)
EOSINOPHIL NFR BLD AUTO: 2 % (ref 0–6)
ERYTHROCYTE [DISTWIDTH] IN BLOOD BY AUTOMATED COUNT: 13.1 % (ref 11.6–15.1)
ETHANOL SERPL-MCNC: 212 MG/DL
ETHANOL SERPL-MCNC: 39 MG/DL
EXT PREGNANCY TEST URINE: NEGATIVE
EXT. CONTROL: NORMAL
GFR SERPL CREATININE-BSD FRML MDRD: 104 ML/MIN/1.73SQ M
GFR SERPL CREATININE-BSD FRML MDRD: 106 ML/MIN/1.73SQ M
GLUCOSE SERPL-MCNC: 88 MG/DL (ref 65–140)
GLUCOSE SERPL-MCNC: 97 MG/DL (ref 65–140)
GLUCOSE UR STRIP-MCNC: NEGATIVE MG/DL
HCT VFR BLD AUTO: 44.9 % (ref 34.8–46.1)
HGB BLD-MCNC: 15.1 G/DL (ref 11.5–15.4)
HGB UR QL STRIP.AUTO: ABNORMAL
IMM GRANULOCYTES # BLD AUTO: 0.02 THOUSAND/UL (ref 0–0.2)
IMM GRANULOCYTES NFR BLD AUTO: 0 % (ref 0–2)
KETONES UR STRIP-MCNC: NEGATIVE MG/DL
LEUKOCYTE ESTERASE UR QL STRIP: NEGATIVE
LYMPHOCYTES # BLD AUTO: 1.42 THOUSANDS/ÂΜL (ref 0.6–4.47)
LYMPHOCYTES NFR BLD AUTO: 21 % (ref 14–44)
MCH RBC QN AUTO: 30.3 PG (ref 26.8–34.3)
MCHC RBC AUTO-ENTMCNC: 33.6 G/DL (ref 31.4–37.4)
MCV RBC AUTO: 90 FL (ref 82–98)
MONOCYTES # BLD AUTO: 0.51 THOUSAND/ÂΜL (ref 0.17–1.22)
MONOCYTES NFR BLD AUTO: 8 % (ref 4–12)
NEUTROPHILS # BLD AUTO: 4.7 THOUSANDS/ÂΜL (ref 1.85–7.62)
NEUTS SEG NFR BLD AUTO: 69 % (ref 43–75)
NITRITE UR QL STRIP: NEGATIVE
NON-SQ EPI CELLS URNS QL MICRO: ABNORMAL /HPF
NRBC BLD AUTO-RTO: 0 /100 WBCS
OPIATES UR QL SCN: NEGATIVE
OXYCODONE+OXYMORPHONE UR QL SCN: NEGATIVE
PCP UR QL: NEGATIVE
PH UR STRIP.AUTO: 6 [PH]
PLATELET # BLD AUTO: 176 THOUSANDS/UL (ref 149–390)
PMV BLD AUTO: 9.5 FL (ref 8.9–12.7)
POTASSIUM SERPL-SCNC: 3.3 MMOL/L (ref 3.5–5.3)
POTASSIUM SERPL-SCNC: 3.3 MMOL/L (ref 3.5–5.3)
PROT SERPL-MCNC: 6.8 G/DL (ref 6.4–8.4)
PROT SERPL-MCNC: 7.5 G/DL (ref 6.4–8.4)
PROT UR STRIP-MCNC: NEGATIVE MG/DL
RBC # BLD AUTO: 4.99 MILLION/UL (ref 3.81–5.12)
RBC #/AREA URNS AUTO: ABNORMAL /HPF
SALICYLATES SERPL-MCNC: <5 MG/DL (ref 3–20)
SODIUM SERPL-SCNC: 141 MMOL/L (ref 135–147)
SODIUM SERPL-SCNC: 143 MMOL/L (ref 135–147)
SP GR UR STRIP.AUTO: 1.01
THC UR QL: POSITIVE
UROBILINOGEN UR QL STRIP.AUTO: 0.2 E.U./DL
WBC # BLD AUTO: 6.77 THOUSAND/UL (ref 4.31–10.16)
WBC #/AREA URNS AUTO: ABNORMAL /HPF

## 2023-10-02 PROCEDURE — 80143 DRUG ASSAY ACETAMINOPHEN: CPT | Performed by: EMERGENCY MEDICINE

## 2023-10-02 PROCEDURE — 85025 COMPLETE CBC W/AUTO DIFF WBC: CPT | Performed by: EMERGENCY MEDICINE

## 2023-10-02 PROCEDURE — 99291 CRITICAL CARE FIRST HOUR: CPT | Performed by: EMERGENCY MEDICINE

## 2023-10-02 PROCEDURE — 81003 URINALYSIS AUTO W/O SCOPE: CPT | Performed by: EMERGENCY MEDICINE

## 2023-10-02 PROCEDURE — 93005 ELECTROCARDIOGRAM TRACING: CPT

## 2023-10-02 PROCEDURE — 80307 DRUG TEST PRSMV CHEM ANLYZR: CPT | Performed by: EMERGENCY MEDICINE

## 2023-10-02 PROCEDURE — 82550 ASSAY OF CK (CPK): CPT | Performed by: EMERGENCY MEDICINE

## 2023-10-02 PROCEDURE — 80053 COMPREHEN METABOLIC PANEL: CPT | Performed by: EMERGENCY MEDICINE

## 2023-10-02 PROCEDURE — 81001 URINALYSIS AUTO W/SCOPE: CPT | Performed by: EMERGENCY MEDICINE

## 2023-10-02 PROCEDURE — 96365 THER/PROPH/DIAG IV INF INIT: CPT

## 2023-10-02 PROCEDURE — 81025 URINE PREGNANCY TEST: CPT | Performed by: EMERGENCY MEDICINE

## 2023-10-02 PROCEDURE — 96361 HYDRATE IV INFUSION ADD-ON: CPT

## 2023-10-02 PROCEDURE — 80179 DRUG ASSAY SALICYLATE: CPT | Performed by: EMERGENCY MEDICINE

## 2023-10-02 PROCEDURE — 96367 TX/PROPH/DG ADDL SEQ IV INF: CPT

## 2023-10-02 PROCEDURE — 99285 EMERGENCY DEPT VISIT HI MDM: CPT

## 2023-10-02 PROCEDURE — 96366 THER/PROPH/DIAG IV INF ADDON: CPT

## 2023-10-02 PROCEDURE — 82077 ASSAY SPEC XCP UR&BREATH IA: CPT | Performed by: EMERGENCY MEDICINE

## 2023-10-02 PROCEDURE — 36415 COLL VENOUS BLD VENIPUNCTURE: CPT | Performed by: EMERGENCY MEDICINE

## 2023-10-02 RX ORDER — NICOTINE 21 MG/24HR
21 PATCH, TRANSDERMAL 24 HOURS TRANSDERMAL ONCE
Status: COMPLETED | OUTPATIENT
Start: 2023-10-02 | End: 2023-10-03

## 2023-10-02 RX ORDER — LORAZEPAM 1 MG/1
2 TABLET ORAL ONCE
Status: COMPLETED | OUTPATIENT
Start: 2023-10-02 | End: 2023-10-02

## 2023-10-02 RX ORDER — LORAZEPAM 1 MG/1
0.5 TABLET ORAL ONCE
Status: COMPLETED | OUTPATIENT
Start: 2023-10-02 | End: 2023-10-02

## 2023-10-02 RX ORDER — CLONIDINE HYDROCHLORIDE 0.1 MG/1
0.1 TABLET ORAL 3 TIMES DAILY
COMMUNITY
Start: 2023-08-07

## 2023-10-02 RX ORDER — POTASSIUM CHLORIDE 14.9 MG/ML
20 INJECTION INTRAVENOUS ONCE
Status: COMPLETED | OUTPATIENT
Start: 2023-10-02 | End: 2023-10-02

## 2023-10-02 RX ORDER — MELOXICAM 15 MG/1
15 TABLET ORAL DAILY
COMMUNITY
Start: 2023-08-07

## 2023-10-02 RX ORDER — ACTIVATED CHARCOAL 208 MG/ML
100 SUSPENSION ORAL ONCE
Status: DISCONTINUED | OUTPATIENT
Start: 2023-10-02 | End: 2023-10-03 | Stop reason: HOSPADM

## 2023-10-02 RX ORDER — LORAZEPAM 1 MG/1
1 TABLET ORAL ONCE
Status: COMPLETED | OUTPATIENT
Start: 2023-10-02 | End: 2023-10-02

## 2023-10-02 RX ORDER — ESCITALOPRAM OXALATE 20 MG/1
20 TABLET ORAL DAILY
COMMUNITY
Start: 2023-08-07 | End: 2023-12-05

## 2023-10-02 RX ORDER — MAGNESIUM SULFATE HEPTAHYDRATE 40 MG/ML
2 INJECTION, SOLUTION INTRAVENOUS ONCE
Status: COMPLETED | OUTPATIENT
Start: 2023-10-02 | End: 2023-10-02

## 2023-10-02 RX ORDER — LANOLIN ALCOHOL/MO/W.PET/CERES
3 CREAM (GRAM) TOPICAL
COMMUNITY

## 2023-10-02 RX ORDER — HYDROXYZINE PAMOATE 50 MG/1
50 CAPSULE ORAL 4 TIMES DAILY PRN
COMMUNITY
Start: 2023-08-07

## 2023-10-02 RX ADMIN — POTASSIUM CHLORIDE 20 MEQ: 14.9 INJECTION, SOLUTION INTRAVENOUS at 19:47

## 2023-10-02 RX ADMIN — NICOTINE 21 MG: 21 PATCH, EXTENDED RELEASE TRANSDERMAL at 10:32

## 2023-10-02 RX ADMIN — MAGNESIUM SULFATE HEPTAHYDRATE 2 G: 40 INJECTION, SOLUTION INTRAVENOUS at 17:54

## 2023-10-02 RX ADMIN — LORAZEPAM 0.5 MG: 1 TABLET ORAL at 19:12

## 2023-10-02 RX ADMIN — SODIUM CHLORIDE 1000 ML: 0.9 INJECTION, SOLUTION INTRAVENOUS at 11:02

## 2023-10-02 RX ADMIN — LORAZEPAM 1 MG: 1 TABLET ORAL at 10:57

## 2023-10-02 RX ADMIN — LORAZEPAM 2 MG: 1 TABLET ORAL at 12:19

## 2023-10-02 NOTE — ED NOTES
Patient is in bathroom at this time  with [facility]  for a U/A sample      Brianne Irvin  10/02/23 1589

## 2023-10-02 NOTE — LETTER
[address] [address]  Dept: [phone]      EMTALA TRANSFER CONSENT    NAME Roberta Stewart                                         1975                              MRN [id number]    I have been informed of my rights regarding examination, treatment, and transfer   by Dr. Ame Lino MD    Benefits: Continuity of care    Risks: Potential for delay in receiving treatment      Consent for Transfer:  I acknowledge that my medical condition has been evaluated and explained to me by the emergency department physician or other qualified medical person and/or my attending physician, who has recommended that I be transferred to the service of  Accepting Physician: Cat Monte at [address] Name, [address] : [address] The above potential benefits of such transfer, the potential risks associated with such transfer, and the probable risks of not being transferred have been explained to me, and I fully understand them. The doctor has explained that, in my case, the benefits of transfer outweigh the risks. I agree to be transferred. I authorize the performance of emergency medical procedures and treatments upon me in both transit and upon arrival at the receiving facility. Additionally, I authorize the release of any and all medical records to the receiving facility and request they be transported with me, if possible. I understand that the safest mode of transportation during a medical emergency is an ambulance and that the Hospital advocates the use of this mode of transport. Risks of traveling to the receiving facility by car, including absence of medical control, life sustaining equipment, such as oxygen, and medical personnel has been explained to me and I fully understand them.     (JAMIE CORRECT BOX BELOW)  [  ]  I consent to the stated transfer and to be transported by ambulance/helicopter. [  ]  I consent to the stated transfer, but refuse transportation by ambulance and accept full responsibility for my transportation by car. I understand the risks of non-ambulance transfers and I exonerate the Hospital and its staff from any deterioration in my condition that results from this refusal.    X___________________________________________    DATE  10/03/23  TIME________  Signature of patient or legally responsible individual signing on patient behalf           RELATIONSHIP TO PATIENT_________________________          Provider Certification    NAME Benjamin Verdugo                                         1975                              MRN [id number]    A medical screening exam was performed on the above named patient. Based on the examination:    Condition Necessitating Transfer The encounter diagnosis was Encounter for psychological evaluation. Patient Condition: The patient has been stabilized such that within reasonable medical probability, no material deterioration of the patient condition or the condition of the unborn child(ema) is likely to result from the transfer    Reason for Transfer: Level of Care needed not available at this facility    Transfer Requirements: [address] available and qualified personnel available for treatment as acknowledged by Ron Cruz [phone]  Agreed to accept transfer and to provide appropriate medical treatment as acknowledged by       Dawson Naranjo  Appropriate medical records of the examination and treatment of the patient are provided at the time of transfer   [address] _______  Transfer will be performed by qualified personnel from [facility]  and appropriate transfer equipment as required, including the use of necessary and appropriate life support measures.     Provider Certification: I have examined the patient and explained the following risks and benefits of being transferred/refusing transfer to the patient/family:  General risk, such as traffic hazards, adverse weather conditions, rough terrain or turbulence, possible failure of equipment (including vehicle or aircraft), or consequences of actions of persons outside the control of the transport personnel      Based on these reasonable risks and benefits to the patient and/or the unborn child(ema), and based upon the information available at the time of the patient’s examination, I certify that the medical benefits reasonably to be expected from the provision of appropriate medical treatments at another medical facility outweigh the increasing risks, if any, to the individual’s medical condition, and in the case of labor to the unborn child, from effecting the transfer.     X____________________________________________ DATE 10/03/23        TIME_______      ORIGINAL - SEND TO MEDICAL RECORDS   COPY - SEND WITH PATIENT DURING TRANSFER

## 2023-10-02 NOTE — ED PROVIDER NOTES
History  Chief Complaint   Patient presents with   • Suicidal     Police found patient with a knife cutting her wrist requesting to be left alone to die. 58 yo female presenting to the ed with police after reportedly made suicidal threats and was cutting herself with a knife. Police filled out 695. Patient states she was cutting her wrist to relieve stress. Patient denies any physical pain. Currently denying SI or HI. Prior to Admission Medications   Prescriptions Last Dose Informant Patient Reported? Taking? HYDROcodone-acetaminophen (NORCO) 5-325 mg per tablet  Self No No   Sig: Take 1 tablet by mouth every 6 (six) hours as needed for pain for up to 8 dosesMax Daily Amount: 4 tablets   Patient not taking: Reported on 2022   QUEtiapine (SEROquel) 50 mg tablet  Self Yes No   cloNIDine (CATAPRES) 0.1 mg tablet   Yes Yes   Sig: Take 0.1 mg by mouth Three times a day   erythromycin (ILOTYCIN) ophthalmic ointment  Self No No   Sig: Place a 1/2 inch ribbon of ointment into the lower eyelid.    Patient not taking: Reported on 2022   escitalopram (LEXAPRO) 20 mg tablet   Yes Yes   Sig: Take 20 mg by mouth daily   hydrOXYzine pamoate (VISTARIL) 50 mg capsule   Yes Yes   Sig: Take 50 mg by mouth 4 (four) times a day as needed   melatonin 3 mg   Yes No   Sig: Take 3 mg by mouth   meloxicam (MOBIC) 15 mg tablet   Yes Yes   Sig: Take 15 mg by mouth daily   ondansetron (Zofran ODT) 4 mg disintegrating tablet   No No   Sig: Take 1 tablet (4 mg total) by mouth every 6 (six) hours as needed for nausea or vomiting for up to 2 days      Facility-Administered Medications: None       Past Medical History:   Diagnosis Date   • Infectious viral hepatitis        Past Surgical History:   Procedure Laterality Date   •  SECTION         Family History   Problem Relation Age of Onset   • Diabetes Mother    • Heart disease Maternal Grandfather    • Heart disease Paternal Grandfather      I have reviewed and agree with the history as documented. E-Cigarette/Vaping     E-Cigarette/Vaping Substances     Social History     Tobacco Use   • Smoking status: Every Day     Packs/day: 1.00     Types: Cigarettes   • Smokeless tobacco: Never   Substance Use Topics   • Alcohol use: Yes   • Drug use: Never       Review of Systems   Skin: Positive for wound (L arm ). All other systems reviewed and are negative. Physical Exam  Physical Exam  Vitals and nursing note reviewed. Constitutional:       General: She is not in acute distress. Appearance: She is well-developed. She is not diaphoretic. HENT:      Head: Normocephalic and atraumatic. Right Ear: External ear normal.      Left Ear: External ear normal.      Nose: Nose normal.   Eyes:      General: No scleral icterus. Right eye: No discharge. Left eye: No discharge. Conjunctiva/sclera: Conjunctivae normal.      Pupils: Pupils are equal, round, and reactive to light. Neck:      Vascular: No JVD. Trachea: No tracheal deviation. Cardiovascular:      Rate and Rhythm: Normal rate and regular rhythm. Heart sounds: Normal heart sounds. No murmur heard. No friction rub. No gallop. Pulmonary:      Effort: Pulmonary effort is normal. No respiratory distress. Breath sounds: Normal breath sounds. No stridor. No wheezing or rales. Abdominal:      General: Bowel sounds are normal. There is no distension. Palpations: Abdomen is soft. There is no mass. Tenderness: There is no abdominal tenderness. There is no guarding. Musculoskeletal:         General: No tenderness or deformity. Normal range of motion. Cervical back: Normal range of motion and neck supple. Skin:     General: Skin is warm and dry. Coloration: Skin is not pale. Findings: Laceration (L forearm vertical cuts, not reparable) present. No erythema or rash.    Neurological:      Mental Status: She is alert and oriented to person, place, and time.      Cranial Nerves: No cranial nerve deficit. Sensory: No sensory deficit. Motor: No abnormal muscle tone. Psychiatric:         Behavior: Behavior normal.         Thought Content:  Thought content normal.         Judgment: Judgment normal.         Vital Signs  ED Triage Vitals [10/02/23 1035]   Temperature Pulse Respirations Blood Pressure SpO2   97.7 °F (36.5 °C) (!) 121 16 (!) 197/126 96 %      Temp Source Heart Rate Source Patient Position - Orthostatic VS BP Location FiO2 (%)   Temporal Monitor Sitting Right arm --      Pain Score       --           Vitals:    10/02/23 1230 10/02/23 1800 10/02/23 2133 10/03/23 0256   BP: 155/98 (!) 180/111 (!) 153/102 146/96   Pulse: 78 96 82 73   Patient Position - Orthostatic VS:   Lying          Visual Acuity      ED Medications  Medications   nicotine (NICODERM CQ) 21 mg/24 hr TD 24 hr patch 21 mg (21 mg Transdermal Medication Applied 10/2/23 1032)   LORazepam (ATIVAN) tablet 1 mg (1 mg Oral Given 10/2/23 1057)   sodium chloride 0.9 % bolus 1,000 mL (0 mL Intravenous Stopped 10/2/23 1707)   LORazepam (ATIVAN) tablet 2 mg (2 mg Oral Given 10/2/23 1219)   magnesium sulfate 2 g/50 mL IVPB (premix) 2 g (0 g Intravenous Stopped 10/2/23 1854)   potassium chloride 20 mEq IVPB (premix) (0 mEq Intravenous Stopped 10/2/23 2134)   LORazepam (ATIVAN) tablet 0.5 mg (0.5 mg Oral Given 10/2/23 1912)   LORazepam (ATIVAN) tablet 1 mg (1 mg Oral Given 10/3/23 0857)       Diagnostic Studies  Results Reviewed     Procedure Component Value Units Date/Time    FLU/RSV/COVID - if FLU/RSV clinically relevant [[id number]]  (Normal) Collected: 10/03/23 0253    Lab Status: Final result Specimen: Nares from Nose Updated: 10/03/23 0335     SARS-CoV-2 Negative     INFLUENZA A PCR Negative     INFLUENZA B PCR Negative     RSV PCR Negative    Narrative:      FOR PEDIATRIC PATIENTS - copy/paste COVID Guidelines URL to browser: [email or web address]    SARS-CoV-2 assay is a Nucleic Acid Amplification assay intended for the  qualitative detection of nucleic acid from SARS-CoV-2 in nasopharyngeal  swabs. Results are for the presumptive identification of SARS-CoV-2 RNA. Positive results are indicative of infection with SARS-CoV-2, the virus  causing COVID-19, but do not rule out bacterial infection or co-infection  with other viruses. Laboratories within the [facility] and its  territories are required to report all positive results to the appropriate  public health authorities. Negative results do not preclude SARS-CoV-2  infection and should not be used as the sole basis for treatment or other  patient management decisions. Negative results must be combined with  clinical observations, patient history, and epidemiological information. This test has not been FDA cleared or approved. This test has been authorized by FDA under an Emergency Use Authorization  (EUA). This test is only authorized for the duration of time the  declaration that circumstances exist justifying the authorization of the  emergency use of an in vitro diagnostic tests for detection of SARS-CoV-2  virus and/or diagnosis of COVID-19 infection under section 564(b)(1) of  the Act, 21 U. S.C. 177PIM-4(U)(1), unless the authorization is terminated  or revoked sooner. The test has been validated but independent review by FDA  and CLIA is pending. Test performed using [employer] GeneXpert: This RT-PCR assay targets N2,  a region unique to SARS-CoV-2. A conserved region in the E-gene was chosen  for pan-Sarbecovirus detection which includes SARS-CoV-2. According to CMS-2020-01-R, this platform meets the definition of high-throughput technology.     Ethanol [[id number]]  (Abnormal) Collected: 10/02/23 1710    Lab Status: Final result Specimen: Blood from Arm, Left Updated: 10/02/23 1740     Ethanol Lvl 39 mg/dL Comprehensive metabolic panel [[id number]]  (Abnormal) Collected: 10/02/23 1710    Lab Status: Final result Specimen: Blood from Arm, Left Updated: 10/02/23 1735     Sodium 143 mmol/L      Potassium 3.3 mmol/L      Chloride 109 mmol/L      CO2 24 mmol/L      ANION GAP 10 mmol/L      BUN 7 mg/dL      Creatinine 0.70 mg/dL      Glucose 88 mg/dL      Calcium 8.2 mg/dL      AST 98 U/L      ALT 66 U/L      Alkaline Phosphatase 41 U/L      Total Protein 6.8 g/dL      Albumin 3.9 g/dL      Total Bilirubin 0.58 mg/dL      eGFR 104 ml/min/1.73sq m     Narrative:      [facility] Disease (CKD):   •  Stage 1 with normal or high GFR (GFR > 90 mL/min/1.73 square meters)  •  Stage 2 Mild CKD (GFR = 60-89 mL/min/1.73 square meters)  •  Stage 3A Moderate CKD (GFR = 45-59 mL/min/1.73 square meters)  •  Stage 3B Moderate CKD (GFR = 30-44 mL/min/1.73 square meters)  •  Stage 4 Severe CKD (GFR = 15-29 mL/min/1.73 square meters)  •  Stage 5 End Stage CKD (GFR <15 mL/min/1.73 square meters)  Note: GFR calculation is accurate only with a steady state creatinine    Rapid drug screen, urine [[id number]]  (Abnormal) Collected: 10/02/23 1151    Lab Status: Final result Specimen: Urine, Clean Catch Updated: 10/02/23 1211     Amph/Meth UR Negative     Barbiturate Ur Negative     Benzodiazepine Urine Negative     Cocaine Urine Negative     Methadone Urine --     Opiate Urine Negative     PCP Ur Negative     THC Urine Positive     Oxycodone Urine Negative    Narrative:      Presumptive report. If requested, specimen will be sent to reference lab for confirmation. FOR MEDICAL PURPOSES ONLY. IF CONFIRMATION NEEDED PLEASE CONTACT THE LAB WITHIN 5 DAYS.     Drug Screen Cutoff Levels:  AMPHETAMINE/METHAMPHETAMINES  1000 ng/mL  BARBITURATES     200 ng/mL  BENZODIAZEPINES     200 ng/mL  COCAINE      300 ng/mL  METHADONE      300 ng/mL  OPIATES      300 ng/mL  PHENCYCLIDINE     25 ng/mL  THC       50 ng/mL  OXYCODONE      100 ng/mL    CK [[id number]]  (Normal) Collected: 10/02/23 1058    Lab Status: Final result Specimen: Blood from Arm, Left Updated: 10/02/23 1205     Total  U/L     Urine Microscopic [[id number]]  (Abnormal) Collected: 10/02/23 1151    Lab Status: Final result Specimen: Urine, Clean Catch Updated: 10/02/23 1200     RBC, UA Innumerable /hpf      WBC, UA 0-1 /hpf      Epithelial Cells Occasional /hpf      Bacteria, UA None Seen /hpf     POCT pregnancy, urine [[id number]]  (Normal) Resulted: 10/02/23 1200    Lab Status: Final result Specimen: Urine Updated: 10/02/23 1200     EXT Preg Test, Ur Negative     Control Valid    UA w Reflex to Microscopic w Reflex to Culture [[id number]]  (Abnormal) Collected: 10/02/23 1151    Lab Status: Final result Specimen: Urine, Clean Catch Updated: 10/02/23 1157     Color, UA Yellow     Clarity, UA Clear     Specific Gravity, UA 1.010     pH, UA 6.0     Leukocytes, UA Negative     Nitrite, UA Negative     Protein, UA Negative mg/dl      Glucose, UA Negative mg/dl      Ketones, UA Negative mg/dl      Urobilinogen, UA 0.2 E.U./dl      Bilirubin, UA Negative     Occult Blood, UA 3+    Ethanol [[id number]]  (Abnormal) Collected: 10/02/23 1058    Lab Status: Final result Specimen: Blood from Arm, Left Updated: 10/02/23 1125     Ethanol Lvl 212 mg/dL     Comprehensive metabolic panel [[id number]]  (Abnormal) Collected: 10/02/23 1058    Lab Status: Final result Specimen: Blood from Arm, Left Updated: 10/02/23 1123     Sodium 141 mmol/L      Potassium 3.3 mmol/L      Chloride 104 mmol/L      CO2 21 mmol/L      ANION GAP 16 mmol/L      BUN 6 mg/dL      Creatinine 0.65 mg/dL      Glucose 97 mg/dL      Calcium 8.7 mg/dL       U/L      ALT 73 U/L      Alkaline Phosphatase 49 U/L      Total Protein 7.5 g/dL      Albumin 4.5 g/dL      Total Bilirubin 0.60 mg/dL      eGFR 106 ml/min/1.73sq m     Narrative:      [place] guidelines for Chronic Kidney Disease (CKD):   •  Stage 1 with normal or high GFR (GFR > 90 mL/min/1.73 square meters)  •  Stage 2 Mild CKD (GFR = 60-89 mL/min/1.73 square meters)  •  Stage 3A Moderate CKD (GFR = 45-59 mL/min/1.73 square meters)  •  Stage 3B Moderate CKD (GFR = 30-44 mL/min/1.73 square meters)  •  Stage 4 Severe CKD (GFR = 15-29 mL/min/1.73 square meters)  •  Stage 5 End Stage CKD (GFR <15 mL/min/1.73 square meters)  Note: GFR calculation is accurate only with a steady state creatinine    Salicylate level [[id number]]  (Normal) Collected: 10/02/23 1058    Lab Status: Final result Specimen: Blood from Arm, Left Updated: 88/86/83 5387     Salicylate Lvl <5 mg/dL     Acetaminophen level-If concentration is detectable, please discuss with medical  on call.  [[id number]]  (Abnormal) Collected: 10/02/23 1058    Lab Status: Final result Specimen: Blood from Arm, Left Updated: 10/02/23 1123     Acetaminophen Level <10 ug/mL     CBC and differential [[id number]] Collected: 10/02/23 1058    Lab Status: Final result Specimen: Blood from Arm, Left Updated: 10/02/23 1102     WBC 6.77 Thousand/uL      RBC 4.99 Million/uL      Hemoglobin 15.1 g/dL      Hematocrit 44.9 %      MCV 90 fL      MCH 30.3 pg      MCHC 33.6 g/dL      RDW 13.1 %      MPV 9.5 fL      Platelets 787 Thousands/uL      nRBC 0 /100 WBCs      Neutrophils Relative 69 %      Immat GRANS % 0 %      Lymphocytes Relative 21 %      Monocytes Relative 8 %      Eosinophils Relative 2 %      Basophils Relative 0 %      Neutrophils Absolute 4.70 Thousands/µL      Immature Grans Absolute 0.02 Thousand/uL      Lymphocytes Absolute 1.42 Thousands/µL      Monocytes Absolute 0.51 Thousand/µL      Eosinophils Absolute 0.10 Thousand/µL      Basophils Absolute 0.02 Thousands/µL                  No orders to display              Procedures  CriticalCare Time    Date/Time: 10/4/2023 7:20 AM    Performed by: Quinten Rosales DO  Authorized by: Quinten Rosales DO    Critical care provider statement:     Critical care time (minutes):  35    Critical care was necessary to treat or prevent imminent or life-threatening deterioration of the following conditions:  Toxidrome    Critical care was time spent personally by me on the following activities:  Blood draw for specimens, obtaining history from patient or surrogate, development of treatment plan with patient or surrogate, discussions with consultants, evaluation of patient's response to treatment, examination of patient, review of old charts, re-evaluation of patient's condition, ordering and review of radiographic studies, ordering and review of laboratory studies, ordering and performing treatments and interventions and interpretation of cardiac output measurements    I assumed direction of critical care for this patient from another provider in my specialty: no               ED Course  ED Course as of 10/04/23 0719   Mon Oct 02, 2023   1039 Patient found chewing on pills which she was storing in her bra. Patient states that they were hydroxyzine and the reason she did this when she was concerned we were going to help her. Unable to ascertain how many of these pills she took we will check baseline labs and monitor at this time. 1135 AST(!): 117  Discussed elevated LFTs with on call toxicology ans as patient with alcohol on board to hold and repeat LFTs in 6 hours and if going up will order NAC at that time. 1723 Potassium(!): 3.3  20K and 2gms Mag ordered. 1727 Signed out to Dr. Mignon Elise pending results and medical clearance and Crisis evaluation    1740 Comprehensive metabolic panel(!)  Discussed LFTs with toxicology, downtrending from prior. Will hold NAC. SBIRT 20yo+    Flowsheet Row Most Recent Value   Initial Alcohol Screen: US AUDIT-C     1. How often do you have a drink containing alcohol? 0 Filed at: 10/02/2023 1038   2.  How many drinks containing alcohol do you have on a typical day you are drinking? 0 Filed at: 10/02/2023 1038   3b. FEMALE Any Age, or MALE 65+: How often do you have 4 or more drinks on one occassion? 0 Filed at: 10/02/2023 1038   Audit-C Score 0 Filed at: 10/02/2023 1038   RAJESH: How many times in the past year have you. .. Used an illegal drug or used a prescription medication for non-medical reasons? Never Filed at: 10/02/2023 1038                    Medical Decision Making  Patient stating her last tetanus shot was 10 years ago. Refusing tetanus shot today. Explained to patient that tetanus can be fatal and patient states "yes that's the one that makes your jaw lock up."  Police initiated 928 however patient under the influence at this time. Will require reevaluation. Patient noted to be taking pills of unknown origin while in the bathroom. After further evaluation appears to be 50 mg hydroxyzine tablets. Toxicology consulted recommending 6-hour evaluation and observation for possible anticholinergic symptoms. Discussed with patient taking charcoal however patient states she only took hydroxyzine x1 pill, refusing to take charcoal this time, as patient medically stable and with no obvious toxidrome along with discussion with on-call  no need for NG tube with forced charcoal administration at this time. Multiple EKGs and continual monitoring for any cholinergic symptoms. EKGs with mildly prolonged QTc below 500 range however with QRS normal and no other abnormal intervals, no ischemic changes. Blood work otherwise unremarkable. Patient cleared medically signed out to Dr. Shaista Smart pending psychiatric evaluation. Patient was anxious while in the ER requesting medications to help calm her down given oral Ativan which patient took of her own volition. Amount and/or Complexity of Data Reviewed  Labs: ordered. Decision-making details documented in ED Course. Risk  OTC drugs. Prescription drug management.           Disposition  Final diagnoses: Encounter for psychological evaluation     Time reflects when diagnosis was documented in both MDM as applicable and the Disposition within this note     Time User Action Codes Description Comment    10/2/2023 10:33 AM Jordon Berkowitz Add [Z00.8] Encounter for psychological evaluation       ED Disposition     ED Disposition   Transfer to Another Facility    Condition   --    Date/Time   Tue Oct 3, 2023  9:20 AM    Comment   Vipul Poole should be transferred out to psych.            MD Documentation    Flowsheet Row Most Recent Value   Patient Condition The patient has been stabilized such that within reasonable medical probability, no material deterioration of the patient condition or the condition of the unborn child(ema) is likely to result from the transfer   Reason for Transfer Level of Care needed not available at this facility   Benefits of Transfer Continuity of care   Risks of Transfer Potential for delay in receiving treatment   Accepting Physician [address] [address] Name, [address]    (Name & Tel number) Tucker Wong [phone]   Transported by [facility] and Unit #) [place]   Provider Certification General risk, such as traffic hazards, adverse weather conditions, rough terrain or turbulence, possible failure of equipment (including vehicle or aircraft), or consequences of actions of persons outside the control of the transport personnel      RN Documentation    Flowsheet Row [facility] Name, [address] [place] [address] [facility]    (Name & Tel number) Tucker Wong [phone]   Transport Mode Ambulance   Transported by (Company and Unit #) [id number] [facility]  [secure psych transport]   Patient Belongings Disposition Sent with patient   Transfer Date 10/03/23   Transfer Time 0900      Follow-up Information    None         Discharge Medication List as of 10/3/2023  9:20 AM      CONTINUE these medications which have NOT CHANGED    Details   cloNIDine (CATAPRES) 0.1 mg tablet Take 0.1 mg by mouth Three times a day, Starting Mon 8/7/2023, Historical Med      escitalopram (LEXAPRO) 20 mg tablet Take 20 mg by mouth daily, Starting Mon 8/7/2023, Until Tue 12/5/2023, Historical Med      hydrOXYzine pamoate (VISTARIL) 50 mg capsule Take 50 mg by mouth 4 (four) times a day as needed, Starting Mon 8/7/2023, Historical Med      meloxicam (MOBIC) 15 mg tablet Take 15 mg by mouth daily, Starting Mon 8/7/2023, Historical Med      erythromycin (ILOTYCIN) ophthalmic ointment Place a 1/2 inch ribbon of ointment into the lower eyelid. , Normal      HYDROcodone-acetaminophen (NORCO) 5-325 mg per tablet Take 1 tablet by mouth every 6 (six) hours as needed for pain for up to 8 dosesMax Daily Amount: 4 tablets, Starting Sun 11/8/2020, Normal      melatonin 3 mg Take 3 mg by mouth, Historical Med      ondansetron (Zofran ODT) 4 mg disintegrating tablet Take 1 tablet (4 mg total) by mouth every 6 (six) hours as needed for nausea or vomiting for up to 2 days, Starting Wed 10/12/2022, Until Fri 10/14/2022 at 2359, Normal      QUEtiapine (SEROquel) 50 mg tablet Starting Thu 12/8/2022, Historical Med             No discharge procedures on file.     PDMP Review       Value Time User    PDMP Reviewed  Yes 10/12/2022 12:17 PM Juana Stanley DO          ED Provider  Electronically Signed by           Juana Stanley DO  10/04/23 9614

## 2023-10-02 NOTE — CONSULTS
INTERPROFESSIONAL (PHONE) [facility] Toxicology  Michelle Catching 57 y.o. female MRN: [id number]  Unit/Bed#: ED 15 Encounter: [id number]      Reason for Consult / Principal Problem: Overdose  Inpatient consult to Toxicology  Consult performed by: Keyur Hill MD  Consult ordered by: Hema Farrell DO        10/02/23      ASSESSMENT:  43-year-old female found secretly eating green pills in bathroom, identified as hydroxyzine    RECOMMENDATIONS:    If the patient is amenable to drinking activated charcoal, please administer it to the patient. Please do not force the patient to drink activated charcoal or place an NGT to provide it. Monitor the patient for minimum 6 hours. Monitor for the development of anticholinergic toxidrome including hypertension, tachycardia, elevated body temperature, dilated pupils, dry mucous membranes, agitation, altered mental status/hallucinations, seizure, delayed bowel motility/bowel sounds, flushed skin, urinary retention. Avoid giving further antimuscarinics at this time including antipsychotics as this may make the patient's symptoms worse. Please obtain serial EKG q2-3  while patient is tachycardic for monitoring of QRS and QTc. Goal QRS less than 120. Goal QTc less than 500. The patient's LFTs are elevated, acetaminophen or salicylate are detectable, please notify the  on-call as further intervention may be needed. 1:35 PM  Patient has elevated LFTs in the setting of EtOH level 212  At time, NAC deferred  CK WNL  Recommend repeating LFTs at 6 hours and will decide on NAC at that point. For further questions, please contact the medical  on call via [employer] or throughl the [employer] or [employer].      Please see additional teaching note below:    Medical Toxicology  [facility]  Anticholinergic Syndrome  Updated 03/04/2008    a- Background: Anticholinergic intoxication can occur from exposure to a wide variety of prescription and over-the-counter medications and numerous plants and mushrooms. Common drugs that have anticholinergic activities include: antihistamines, antipsychotics, antispasmodics, skeletal muscle relaxants, and tricyclic antidepressants (TCAs). Plants and mushrooms containing anticholinergic alkaloids include: jimsonweed (Datura stramonium), deadly nightshade (Atropa belladonna), and fly agaric (Rebecca Daft). b- Mechanism of Toxicity:  • Competitively antagonize the effect of acetylcholine at peripheral muscarinic receptors. They mostly affect exocrine glands (such as sweating and salivation) and smooth muscle. Inhibition of muscarinic activities in the heart leads to a rapid heartbeat. • Pharmacokinetics: Absorption may be delayed due to its effect on GI motility. Duration of toxic effect can be quite prolonged (e.g. benztropine  2-3 days)  c- Toxic dose: The range of toxicity is highly variable and unpredictable. • Examples of Fatal doses from case reports:  i. Young Child: 1-2 mg Atropine, instilled in the eye  ii. Adult: 32 mg Atropine, IM injection  • Minimal effect: increased heart rate and dry mouth after 360 mg of trospium chloride  d- Clinical presentation: Anticholinergic syndrome is characterized by warm, dry, flushed skin, dry mucous membranes, mydriasis, delirium, tachycardia, ileus, and urinary retention. Jerky myoclonic movements and choreoathetosis are common and can lead to rhabdomyolysis. Hyperthermia, coma, respiratory arrest and seizures may occur. e- Diagnosis: Based on history of exposure and typical features of anticholinergic syndrome. Trial dose of physostigmine can be used to confirm the diagnosis, especially with rapid reversal of the syndrome. f- Laboratory studies: Not generally available for the anticholinergics, but other labs can be useful such as electrolytes, glucose, CPK, and ECG.   g- Treatment: • ABC  • Seizure and muscular hyperactivity: Should be treated with benzodiazepines. Treat aggressively to prevent hyperthermia and rhabdomyolysis and their resultant complications. If unsuccessful use phenobarbital or propofol. • Delirium: Treat with benzodiazepines, if resistant to benzodiazepines consider treating with physostigmine. • GI decontamination maybe helpful in delayed presentation secondary to decreased GI motility provided that the patient is awake and alert. • Enhanced elimination: Procedures such as hemodialysis and repeated-dose activated charcoal are not effective in removing anticholinergic agents. • Physostigmine:   i. Pharmacology: Physostigmine is a carbamate and a reversible inhibitor of acetylcholinesterase. It increases acetylcholine concentration, causing stimulation of both muscarinic and nicotinic receptors. It also can penetrate the blood-brain barrier. It has nonspecific arousal effects. 1. Onset of action: 3-8 minutes after parenteral administration. 2. Duration of action: 30-90 minutes. 3. Elimination half-life: 15-40 minutes. ii. Indications:  1. Severe anticholinergic syndrome from antimuscarinic agents. Generally used to reverse delirium resistant to benzodiazepines. 2. Diagnostically: To differentiate functional psychosis from anticholinergic delirium.  iii. Contraindications:  1. Should not be used as an antidote for cyclic antidepressant overdose because it may worsen cardiac conduction disturbance, cause bradyarrythmias or asystole, and aggravate or precipitate seizures. 2. Do not use physostigmine with concurrent use of depolarizing neuromuscular blockers (e.g. succinylcholine). 3. Known hypersensitivity to agent or preservative. 4. Relative contraindication may include: Asthma, peripheral vascular disease, intestinal and bladder blockade, parkinsonian syndrome, and AV Block. iv. Adverse effects:  1. Bradycardia, heart block, and asystole.   2. Seizure (particularly with rapid administration or excessive dose). 3. Nausea, vomiting, hypersalivation, and diarrhea. 4. Bronchorrhea and bronchospasm. 5. Fasciculation and muscle weakness. v. Dosage:  1. Adult: 0.5-2 mg slow IV push (£ 1 mg/min). 2. Children: 0.02 mg/kg slow IV push (£ 0.5 mg/min). 3. Repeat as needed every 10-30 minutes. 4. Precautions: Patient should be on a cardiac monitor. Atropine should be kept at bedside to treat excessive muscarinic stimulation. 5. Should not be administered IM or as a continuous infusion. 6. It is better to undertreat than to overtreat. Physostigmine toxicity results when used in excess or in the absence of antimuscarinic toxicity. References:  Noah Kong. Poisoning & Drug Overdose. 2007. [employer] [place]. [employer]’s Toxicologic Emergencies. 2006. Hx and PE limited by the dynamics of a phone consultation. I have not personally interviewed or evaluated the patient, but only advised based on the information provided to me. Primary provider is responsible for all clinical decisions. Pertinent history, physical exam and clinical findings and course discussed: Martha Conklin is a 57y.o. year old female who was found in the emergency department bathroom eating an unknown amount of green pills, identified as hydoxyzine. Toxicology was consulted immediately. Past medical history significant for bipolar disorder, chronic hepatitis C. Patient initially presented to the ED with police for a 136. While she was changing in the bathroom, patient was found to be ingesting green pills. Has no complaints at this time. Denies taking more than 1 pill. Her vital signs at triage were notable for hypertension and tachycardia. Review of systems and physical exam not performed by me.     Historical Information   Past Medical History:   Diagnosis Date   • Infectious viral hepatitis      Past Surgical History:   Procedure Laterality Date   •  SECTION       Social History   Social History     Substance and Sexual Activity   Alcohol Use Yes     Social History     Substance and Sexual Activity   Drug Use Never     Social History     Tobacco Use   Smoking Status Every Day   • Packs/day: 1.00   • Types: Cigarettes   Smokeless Tobacco Never     Family History   Problem Relation Age of Onset   • Diabetes Mother    • Heart disease Maternal Grandfather    • Heart disease Paternal Grandfather         Prior to Admission medications    Medication Sig Start Date End Date Taking? Authorizing Provider   cloNIDine (CATAPRES) 0.1 mg tablet Take 0.1 mg by mouth Three times a day 8/7/23  Yes Historical Provider, MD   escitalopram (LEXAPRO) 20 mg tablet Take 20 mg by mouth daily 8/7/23 12/5/23 Yes Historical Provider, MD   hydrOXYzine pamoate (VISTARIL) 50 mg capsule Take 50 mg by mouth 4 (four) times a day as needed 8/7/23  Yes Historical Provider, MD   meloxicam (MOBIC) 15 mg tablet Take 15 mg by mouth daily 8/7/23  Yes Historical Provider, MD   erythromycin (ILOTYCIN) ophthalmic ointment Place a 1/2 inch ribbon of ointment into the lower eyelid.   Patient not taking: Reported on 6/26/2022 2/11/21   Dodie Borden PA-C   HYDROcodone-acetaminophen (NORCO) 5-325 mg per tablet Take 1 tablet by mouth every 6 (six) hours as needed for pain for up to 8 dosesMax Daily Amount: 4 tablets  Patient not taking: Reported on 6/26/2022 11/8/20   Bindu Pereira DO   melatonin 3 mg Take 3 mg by mouth    Historical Provider, MD   ondansetron (Zofran ODT) 4 mg disintegrating tablet Take 1 tablet (4 mg total) by mouth every 6 (six) hours as needed for nausea or vomiting for up to 2 days 10/12/22 10/14/22  Amilcar Borden DO   QUEtiapine (SEROquel) 50 mg tablet  12/8/22   Historical Provider, MD       Current Facility-Administered Medications   Medication Dose Route Frequency   • activated charcoal (Actidose with Sorbitol) in sorbitol suspension 100 g  100 g Oral Once   • LORazepam (ATIVAN) tablet 1 mg  1 mg Oral Once   • nicotine (NICODERM CQ) 21 mg/24 hr TD 24 hr patch 21 mg  21 mg Transdermal Once   • sodium chloride 0.9 % bolus 1,000 mL  1,000 mL Intravenous Once       No Known Allergies    Objective     No intake or output data in the 24 hours ending 10/02/23 1053    Invasive Devices:        Vitals   Vitals:    10/02/23 1035   BP: (!) 197/126   TempSrc: Temporal   Pulse: (!) 121   Resp: 16   Patient Position - Orthostatic VS: Sitting   Temp: 97.7 °F (36.5 °C)         EKG, Pathology, and/or Other Studies: I have personally reviewed pertinent reports. Sinus tachycardia 104, QRS 84, QTc 491, no ST elevation or depression, (+) terminal R, no ectopy, overall impression: nontoxicologic EKG    Lab Results: I have personally reviewed pertinent reports. Labs: Invalid input(s): "LABALBU"           No results found for: "TROPONINI"          Invalid input(s): "EXTPREGUR"      Imaging Studies: I have personally reviewed pertinent reports. Counseling / Coordination of Care  Total time spent today 36 minutes. This was a phone consultation.

## 2023-10-02 NOTE — ED NOTES
Per attending, pt cleared for crisis eval.     TT sent to pt RN requesting copy of 302 for review & good number to contact pt to complete crisis assessment.

## 2023-10-02 NOTE — ED NOTES
Patient given lunch tray ate about 95%. Patient is calm and has no complaints at this time will continue to monitor.        Pavelul Sine  10/02/23 1225

## 2023-10-02 NOTE — LETTER
[address] [address]  Dept: [phone]      EMTALA TRANSFER CONSENT    NAME Stephanie Bond                                         1975                              MRN [id number]    I have been informed of my rights regarding examination, treatment, and transfer   by Dr. Elicia Buchanan MD    Benefits: Continuity of care    Risks: Potential for delay in receiving treatment    Transfer Request   I acknowledge that my medical condition has been evaluated and explained to me by the emergency department physician or other qualified medical person and/or my attending physician who has recommended and offered to me further medical examination and treatment. I understand the Hospital's obligation with respect to the treatment and stabilization of my emergency medical condition. I nevertheless request to be transferred. I release the Hospital, the doctor, and any other persons caring for me from all responsibility or liability for any injury or ill effects that may result from my transfer and agree to accept all responsibility for the consequences of my choice to transfer, rather than receive stabilizing treatment at the Hospital. I understand that because the transfer is my request, my insurance may not provide reimbursement for the services. The Hospital will assist and direct me and my family in how to make arrangements for transfer, but the hospital is not liable for any fees charged by the transport service. In spite of this understanding, I refuse to consent to further medical examination and treatment which has been offered to me, and request transfer to [address] [place] [address] [address] Name, [facility] : [facility]. I authorize the performance of emergency medical procedures and treatments upon me in both transit and upon arrival at the receiving facility.   Additionally, I authorize the release of any and all medical records to the receiving facility and request they be transported with me, if possible. I authorize the performance of emergency medical procedures and treatments upon me in both transit and upon arrival at the receiving facility. Additionally, I authorize the release of any and all medical records to the receiving facility and request they be transported with me, if possible. I understand that the safest mode of transportation during a medical emergency is an ambulance and that the Hospital advocates the use of this mode of transport. Risks of traveling to the receiving facility by car, including absence of medical control, life sustaining equipment, such as oxygen, and medical personnel has been explained to me and I fully understand them. (JAMIE CORRECT BOX BELOW)  [  ]  I consent to the stated transfer and to be transported by ambulance/helicopter. [  ]  I consent to the stated transfer, but refuse transportation by ambulance and accept full responsibility for my transportation by car. I understand the risks of non-ambulance transfers and I exonerate the Hospital and its staff from any deterioration in my condition that results from this refusal.    X___________________________________________    DATE  10/03/23  TIME________  Signature of patient or legally responsible individual signing on patient behalf           RELATIONSHIP TO PATIENT_______self________________          Provider Certification    NAME Ruthie Shaffer                                        [facility] 1975                              MRN [id number]    A medical screening exam was performed on the above named patient. Based on the examination:    Condition Necessitating Transfer The encounter diagnosis was Encounter for psychological evaluation.     Patient Condition: The patient has been stabilized such that within reasonable medical probability, no material deterioration of the patient condition or the condition of the unborn child(ema) is likely to result from the transfer    Reason for Transfer: Level of Care needed not available at this facility    Transfer Requirements: [address] available and qualified personnel available for treatment as acknowledged by Jasmina Cabello [phone]  Agreed to accept transfer and to provide appropriate medical treatment as acknowledged by       Chery Olson  Appropriate medical records of the examination and treatment of the patient are provided at the time of transfer   [address] _______  Transfer will be performed by qualified personnel from [facility]  and appropriate transfer equipment as required, including the use of necessary and appropriate life support measures. Provider Certification: I have examined the patient and explained the following risks and benefits of being transferred/refusing transfer to the patient/family:  General risk, such as traffic hazards, adverse weather conditions, rough terrain or turbulence, possible failure of equipment (including vehicle or aircraft), or consequences of actions of persons outside the control of the transport personnel      Based on these reasonable risks and benefits to the patient and/or the unborn child(ema), and based upon the information available at the time of the patient’s examination, I certify that the medical benefits reasonably to be expected from the provision of appropriate medical treatments at another medical facility outweigh the increasing risks, if any, to the individual’s medical condition, and in the case of labor to the unborn child, from effecting the transfer.     X____________________________________________ DATE 10/03/23        TIME__08:25_____      ORIGINAL - SEND TO MEDICAL RECORDS   COPY - SEND WITH PATIENT DURING TRANSFER

## 2023-10-03 VITALS
BODY MASS INDEX: 22.5 KG/M2 | OXYGEN SATURATION: 97 % | SYSTOLIC BLOOD PRESSURE: 146 MMHG | TEMPERATURE: 97.7 F | HEIGHT: 66 IN | RESPIRATION RATE: 18 BRPM | DIASTOLIC BLOOD PRESSURE: 96 MMHG | HEART RATE: 73 BPM | WEIGHT: 140 LBS

## 2023-10-03 LAB
ATRIAL RATE: 104 BPM
ATRIAL RATE: 83 BPM
ATRIAL RATE: 91 BPM
FLUAV RNA RESP QL NAA+PROBE: NEGATIVE
FLUBV RNA RESP QL NAA+PROBE: NEGATIVE
P AXIS: 48 DEGREES
P AXIS: 49 DEGREES
P AXIS: 54 DEGREES
PR INTERVAL: 152 MS
PR INTERVAL: 152 MS
PR INTERVAL: 156 MS
QRS AXIS: 18 DEGREES
QRS AXIS: 19 DEGREES
QRS AXIS: 61 DEGREES
QRSD INTERVAL: 84 MS
QRSD INTERVAL: 86 MS
QRSD INTERVAL: 88 MS
QT INTERVAL: 374 MS
QT INTERVAL: 408 MS
QT INTERVAL: 430 MS
QTC INTERVAL: 491 MS
QTC INTERVAL: 501 MS
QTC INTERVAL: 505 MS
RSV RNA RESP QL NAA+PROBE: NEGATIVE
SARS-COV-2 RNA RESP QL NAA+PROBE: NEGATIVE
T WAVE AXIS: 50 DEGREES
T WAVE AXIS: 68 DEGREES
T WAVE AXIS: 71 DEGREES
VENTRICULAR RATE: 104 BPM
VENTRICULAR RATE: 83 BPM
VENTRICULAR RATE: 91 BPM

## 2023-10-03 PROCEDURE — 0241U HB NFCT DS VIR RESP RNA 4 TRGT: CPT | Performed by: EMERGENCY MEDICINE

## 2023-10-03 PROCEDURE — 93010 ELECTROCARDIOGRAM REPORT: CPT | Performed by: INTERNAL MEDICINE

## 2023-10-03 RX ORDER — LORAZEPAM 1 MG/1
1 TABLET ORAL ONCE
Status: COMPLETED | OUTPATIENT
Start: 2023-10-03 | End: 2023-10-03

## 2023-10-03 RX ADMIN — LORAZEPAM 1 MG: 1 TABLET ORAL at 08:57

## 2023-10-03 NOTE — ED NOTES
See previous [facility] Suicide Risk Assessment. Re-screening not required unless change in behavior or suicidal ideation. Behavioral Health Assessment deferred as patient is sleeping and would benefit from additional rest.  Vital signs deferred until patient awake, no signs or symptoms of respiratory distress at this time. Once patient is awake and able to participate, will complete assessments.       [place], [place]  10/03/23 0219

## 2023-10-03 NOTE — ED NOTES
Insurance Authorization for admission:   Phone call placed to [facility]. Phone number: [phone]. Spoke to [place].     4 days approved. Level of care: 201. Review on 10/6/2023. Authorization # [id number].

## 2023-10-03 NOTE — ED NOTES
Patient is accepted at [facility]. Patient is accepted by Dr. Piyush Lazcano. Transportation is arranged with [employer]. Transportation is scheduled for 9 AM via [employer].    Patient may go to the floor after 10 AM.

## 2023-10-03 NOTE — ED NOTES
Crisis started bed search at this time.  The following facilities have been contacted;    [facility] sent    [address]

## 2023-10-03 NOTE — ED NOTES
Pt presents on a 590 by police for SIB via cutting wrists with knife. Pt reports she engaged in this behavior as a form of relief, stating "it makes me feel better & releases pressure". Pt reports engaging in SIB before for the same reasons. Pt states "I am a mess. I need mental health medication & I know that". Pt states "I am not right in my head & I need to feel better". Pt reports extreme anxiety that is preventing her from working, causing panic attacks when attempting to enter the grocery store & the inability to drive on the highway. Pt reports good appetite. Pt reports sleep differs in that pt can sleep 12+ hours at a time or be wired & not sleep for several days. Pt reports having a virtual psychiatrist in [facility] as of only a few months ago, who has prescribed the following to pt: hydroxyzine, clonidine, lexapro & another medication pt unable to recall. Per pt, "these medications aren't working". Pt asked what happened that she was found ingesting her medications brought from home, specifically hydroxyzine, in the bathroom at the ER. Pt reports "I was scared & I was afraid I wouldn't be able to have my medication while here. It doesn't work anyway even though I was looking for relief". Pt reports no current SI/HI, and no prior suicide attempts (W/O consideration for SIB incidents). Pt states "I don't want to die". Pt reports she is putting her house up for sale & moving to [facility] with her boyfriend, which "is adding to the stress of everything". Pt reports no access to firearms. Pt requesting & willing to sign herself in on a 201. Pt read her rights for the 302 and for the 201 to understand the difference. Pt is understanding of the rights on the 302 vs 201 commitment. Discussed dispo w/attending who expresses concern over pt attempting to take medications in the ER bathroom, in conjunction with grounds that brought pt to ED on 302 for evaluation.  Psych consult has been ordered.

## 2023-10-03 NOTE — CONSULTS
TeleConsultation - Roseline Coronel 57 y.o. female MRN: [id number]  Unit/Bed#: SH 02 Encounter: [id number]        REQUIRED DOCUMENTATION:     1. This service was provided via Telemedicine. 2. Provider located at [facility].  3. TeleMed provider: Andrew Vaughn MD.  4. Identify all parties in room with patient during tele consult:  Patient  5. Patient was then informed that this was a Telemedicine visit and that the exam was being conducted confidentially over secure lines. My office door was closed. No one else was in the room. Patient acknowledged consent and understanding of privacy and security of the Telemedicine visit, and gave us permission to have the assistant stay in the room in order to assist with the history and to conduct the exam.  I informed the patient that I have reviewed their record in Epic and presented the opportunity for them to ask any questions regarding the visit today. The patient agreed to participate. Assessment/Plan     Active Problems: There are no active Hospital Problems. Assessment:  -Generalized anxiety disorder with panic attacks  -Unspecified depressive d/o  -R/o Alcohol use disorder    Recommendations & Treatment Plan:    -Patient reports severe anxiety, worsening depression, self harm urges, hopelessness. Patient states she feels fearful and unsafe going home. She is agreeable to inpatient treatment as 201. Continue bed search. Defer medication adjustments to [facility] intake, as per patient preference. Monitor for alcohol withdrawal.        Current Medications:   Current Facility-Administered Medications   Medication Dose Route Frequency Provider Last Rate   • activated charcoal  100 g Oral Once Letha Knee, DO     • nicotine  21 mg Transdermal Once Letha Knee, DO         Risks / Benefits of Treatment:  Risks, benefits, and possible side effects of medications explained to patient and patient verbalizes understanding.       Inpatient consult to Psychiatry  Consult performed by: Sonya Prabhakar MD  Consult ordered by: Edson Vargas DO        Physician Requesting Consult: Suki Kim I*  Principal Problem:<principal problem not specified>    Reason for Consult: depressive symptoms, anxiety symptoms and panic attacks      History of Present Illness:  Patient is a 55yo F who presented to ED under 302, with following documented in ED, "Pt [place] on a 5[place] by police for SIB via cutting wrists with knife. Pt reports she engaged in this behavior as a form of relief, stating "it makes me feel better & releases pressure". Pt reports engaging in SIB before for the same reasons. Pt states "I am a mess. I need mental health medication & I know that". Pt states "I am not right in my head & I need to feel better". Pt reports extreme anxiety that is preventing her from working, causing panic attacks when attempting to enter the grocery store & the inability to drive on the highway. Pt reports good appetite. Pt reports sleep differs in that pt can sleep 12+ hours at a time or be wired & not sleep for several days. Pt reports having a virtual psychiatrist in [facility] as of only a few months ago, who has prescribed the following to pt: hydroxyzine, clonidine, lexapro & another medication pt unable to recall. Per pt, "these medications aren't working". Pt asked what happened that she was found ingesting her medications brought from home, specifically hydroxyzine, in the bathroom at the ER. Pt reports "I was scared & I was afraid I wouldn't be able to have my medication while here. It doesn't work anyway even though I was looking for relief". Pt reports no current SI/HI, and no prior suicide attempts (W/O consideration for SIB incidents). Pt states "I don't want to die". Patient was interviewed via telepsychiatry. Patient appeared anxious and dysthymic and affect. Patient was calm cooperative and forthcoming.   Patient reports that she has been having worsening anxiety, with frequent panic attacks and worsening depression. She reports having self-harm impulses and has been cutting her wrists. She states that this is causing severe dysfunction in her life and she is unable to work as a result. Patient feels hopeless and states that she cannot go on like this. She denies having suicidal ideation, but states that she has some safety concerns if discharged. She denies history of suicide attempts. Patient states she does not feel like she can manage on an outpatient basis and feels that her medications are not working. Reports compliance with her medications. She denies drug use, and also denies excessive alcohol use despite having with intoxication to the ED.         Psychiatric Review Of Systems:  Negative except as reported or endorsed in HPI    Historical Information     Past Psychiatric History:     Psychiatric Hospitalizations:   • No history of past inpatient psychiatric admissions  Outpatient Treatment History:   • current treatment with psychiatrist/Advanced Practitioner (Psychiatrist with outside practice)  Suicide Attempts:   • None  History of self-harm:   • Yes, history of self-abusive behavior  Current Psychotropic regimen: Lexapro, hydroxyzine, clonidine, seroquel  Past Psychiatric medication trials: Unknown    Substance Abuse History:  Patient denies use of drugs or excessive alcohol usage, despite presenting ARABELLA level of 212    Family Psychiatric History:   Family History   Problem Relation Age of Onset   • Diabetes Mother    • Heart disease Maternal Grandfather    • Heart disease Paternal Grandfather          Social History:  Lives with partner and 8 yr old daughter  Not employed          Past Medical History:   Diagnosis Date   • Infectious viral hepatitis        Medical Review Of Systems:    Review of Systems    Meds/Allergies     all current active meds have been reviewed  No Known Allergies    Objective     Vital signs in last 24 hours:  Temp:  [97.7 °F (36.5 °C)] 97.7 °F (36.5 °C)  HR:  [] 82  Resp:  [16-20] 18  BP: (146-197)/() 153/102    No intake or output data in the 24 hours ending 10/03/23 0105    Mental Status Evaluation:    Appearance:  disheveled   Behavior:  cooperative   Speech:  normal pitch and normal volume   Mood:  anxious and depressed   Affect:  constricted and mood-congruent   Thought Process:  normal   Associations intact associations   Thought Content:  normal   Perceptual Disturbances: None   Risk Potential: Suicidal Ideations none  Homicidal Ideations none   Sensorium:  person, place and situation   Cognition:  recent and remote memory grossly intact   Consciousness:  alert and awake    Attention: attention span and concentration were age appropriate   Insight:  fair   Judgment: fair       Lab Results: I have personally reviewed all pertinent laboratory/tests results. Most Recent Labs: @RESUFAST(WBC,RBC,HGB,HCT,PLT, RBC,RDW,NEUTROABS,SODIUM,K,CL,CO2,BUN,CREATININE,GLUC,GLUF,CALCIUM,AST,ALT,ALKPHOS,TP,ALB,TBILI,CHOLESTEROL,HDL,TRIG,LDLCALC,NONHDLC,VALPROICTOT,CARBAMAZEPIN,LITHIUM,AMMONIA,[id number],FREET4,T3FREE,EXTPREGUR,PREGSERUM,HCG,HCGQUANT,RPR,HGBA1C,EAG)@    Imaging Studies: No results found. EKG/Pathology/Other Studies:   Lab Results   Component Value Date    VENTRATE 91 10/02/2023    ATRIALRATE 91 10/02/2023    PRINT 152 10/02/2023    QRSDINT 86 10/02/2023    QTINT 408 10/02/2023    QTCINT 501 10/02/2023    PAXIS 54 10/02/2023    QRSAXIS 61 10/02/2023    TWAVEAXIS 71 10/02/2023        Code Status: No Order  Advance Directive and Living Will:      Power of :    POLST:      Screenings:    1. Nutrition Screening  Nutrition Assessment (completed by Staff): Nutrition  Appetite: Good  Nutrition Screen:     Nutrition Assessment:      2. Pain Screening  Pain Screening: Pain Assessment  Pain Assessment Tool: 0-10  Not available on chart    3.  Suicide Screening  ED Crisis Suicide Risk Assessment: Suicide Risk Assessment  Violence Risk to Self: Yes- Within the past 6 months  Description of self harming behaviors or thoughts[de-identified] Pt reports she does not want to die but engages in SIB to calm down. Protective Factors: The patient has hope for the future, The patient does not want to die, The patient has desire to live, The patient has belief that he/she can learn to adjust or cope with problems    C-SSRS Screening (Nursing Assessment - recent):    C-SSRS Screening (Nursing Assessment - since last contact):        •   Counseling / Coordination of Care: Total floor / unit time spent today 50 minutes. Greater than 50% of total time was spent with the patient and / or family counseling and / or coordination of care.  A description of the counseling / coordination of care: Direct Patient Care, Chart Review, and Documentation

## 2023-10-03 NOTE — ED NOTES
Patient is accepted at [facility] at this time, but needs updated vitals to check BP and an updated Covid Test.  Contacting Ed at this time for updated testing.